# Patient Record
Sex: FEMALE | Race: WHITE | Employment: FULL TIME | URBAN - METROPOLITAN AREA
[De-identification: names, ages, dates, MRNs, and addresses within clinical notes are randomized per-mention and may not be internally consistent; named-entity substitution may affect disease eponyms.]

---

## 2022-08-25 ENCOUNTER — HOSPITAL ENCOUNTER (INPATIENT)
Age: 42
LOS: 5 days | Discharge: HOME | DRG: 872 | End: 2022-08-30
Attending: EMERGENCY MEDICINE | Admitting: INTERNAL MEDICINE
Payer: COMMERCIAL

## 2022-08-25 ENCOUNTER — APPOINTMENT (OUTPATIENT)
Dept: GENERAL RADIOLOGY | Age: 42
DRG: 872 | End: 2022-08-25
Payer: COMMERCIAL

## 2022-08-25 DIAGNOSIS — N30.00 ACUTE CYSTITIS WITHOUT HEMATURIA: Primary | ICD-10-CM

## 2022-08-25 DIAGNOSIS — E87.20 METABOLIC ACIDOSIS: ICD-10-CM

## 2022-08-25 DIAGNOSIS — F10.930 ALCOHOL WITHDRAWAL SYNDROME WITHOUT COMPLICATION (HCC): ICD-10-CM

## 2022-08-25 DIAGNOSIS — R74.8 ELEVATED LIVER ENZYMES: ICD-10-CM

## 2022-08-25 PROBLEM — N39.0 UTI (URINARY TRACT INFECTION): Status: ACTIVE | Noted: 2022-08-25

## 2022-08-25 LAB
ALBUMIN SERPL-MCNC: 4 G/DL (ref 3.5–4.6)
ALP BLD-CCNC: 177 U/L (ref 40–130)
ALT SERPL-CCNC: 76 U/L (ref 0–33)
AMPHETAMINE SCREEN, URINE: ABNORMAL
ANION GAP SERPL CALCULATED.3IONS-SCNC: 19 MEQ/L (ref 9–15)
AST SERPL-CCNC: 219 U/L (ref 0–35)
BACTERIA: ABNORMAL /HPF
BARBITURATE SCREEN URINE: ABNORMAL
BASOPHILS ABSOLUTE: 0.1 K/UL (ref 0–0.1)
BASOPHILS RELATIVE PERCENT: 0.3 % (ref 0.1–1.2)
BENZODIAZEPINE SCREEN, URINE: ABNORMAL
BILIRUB SERPL-MCNC: 1.2 MG/DL (ref 0.2–0.7)
BILIRUBIN URINE: NEGATIVE
BLOOD, URINE: ABNORMAL
BUN BLDV-MCNC: 11 MG/DL (ref 6–20)
CALCIUM SERPL-MCNC: 9.7 MG/DL (ref 8.5–9.9)
CANNABINOID SCREEN URINE: POSITIVE
CHLORIDE BLD-SCNC: 95 MEQ/L (ref 95–107)
CLARITY: ABNORMAL
CO2: 19 MEQ/L (ref 20–31)
COCAINE METABOLITE SCREEN URINE: ABNORMAL
COLOR: YELLOW
CREAT SERPL-MCNC: 0.81 MG/DL (ref 0.5–0.9)
EKG ATRIAL RATE: 124 BPM
EKG P AXIS: 69 DEGREES
EKG P-R INTERVAL: 146 MS
EKG Q-T INTERVAL: 348 MS
EKG QRS DURATION: 70 MS
EKG QTC CALCULATION (BAZETT): 499 MS
EKG R AXIS: 83 DEGREES
EKG T AXIS: 47 DEGREES
EKG VENTRICULAR RATE: 124 BPM
EOSINOPHILS ABSOLUTE: 0.1 K/UL (ref 0–0.4)
EOSINOPHILS RELATIVE PERCENT: 0.8 % (ref 0.7–5.8)
EPITHELIAL CELLS, UA: ABNORMAL /HPF
ETHANOL PERCENT: 0.01 G/DL
ETHANOL: 17 MG/DL (ref 0–0.08)
GFR AFRICAN AMERICAN: >60
GFR NON-AFRICAN AMERICAN: >60
GLOBULIN: 3.4 G/DL (ref 2.3–3.5)
GLUCOSE BLD-MCNC: 128 MG/DL (ref 70–99)
GLUCOSE URINE: NEGATIVE MG/DL
HCG(URINE) PREGNANCY TEST: NEGATIVE
HCT VFR BLD CALC: 36.2 % (ref 37–47)
HEMOGLOBIN: 13.1 G/DL (ref 11.2–15.7)
IMMATURE GRANULOCYTES #: 0.1 K/UL
IMMATURE GRANULOCYTES %: 0.6 %
INFLUENZA A BY PCR: NEGATIVE
INFLUENZA B BY PCR: NEGATIVE
KETONES, URINE: NEGATIVE MG/DL
LACTIC ACID: 2.6 MMOL/L (ref 0.5–2.2)
LEUKOCYTE ESTERASE, URINE: ABNORMAL
LYMPHOCYTES ABSOLUTE: 0.5 K/UL (ref 1.2–3.7)
LYMPHOCYTES RELATIVE PERCENT: 3.4 %
Lab: ABNORMAL
MCH RBC QN AUTO: 34.7 PG (ref 25.6–32.2)
MCHC RBC AUTO-ENTMCNC: 36.2 % (ref 32.2–35.5)
MCV RBC AUTO: 96 FL (ref 79.4–94.8)
MONOCYTES ABSOLUTE: 0.9 K/UL (ref 0.2–0.9)
MONOCYTES RELATIVE PERCENT: 6.2 % (ref 4.7–12.5)
NEUTROPHILS ABSOLUTE: 12.9 K/UL (ref 1.6–6.1)
NEUTROPHILS RELATIVE PERCENT: 88.7 % (ref 34–71.1)
NITRITE, URINE: POSITIVE
OPIATE SCREEN URINE: ABNORMAL
PDW BLD-RTO: 12.1 % (ref 11.7–14.4)
PH UA: 6.5 (ref 5–9)
PHENCYCLIDINE SCREEN URINE: ABNORMAL
PLATELET # BLD: 83 K/UL (ref 182–369)
PLATELET SLIDE REVIEW: ABNORMAL
POTASSIUM SERPL-SCNC: 3.3 MEQ/L (ref 3.4–4.9)
PROTEIN UA: 100 MG/DL
RBC # BLD: 3.77 M/UL (ref 3.93–5.22)
RBC UA: ABNORMAL /HPF (ref 0–2)
SARS-COV-2, NAAT: NOT DETECTED
SLIDE REVIEW: ABNORMAL
SODIUM BLD-SCNC: 133 MEQ/L (ref 135–144)
SPECIFIC GRAVITY UA: <=1.005 (ref 1–1.03)
TOTAL PROTEIN: 7.4 G/DL (ref 6.3–8)
URINE REFLEX TO CULTURE: YES
UROBILINOGEN, URINE: 0.2 E.U./DL
WBC # BLD: 14.5 K/UL (ref 4–10)
WBC UA: ABNORMAL /HPF (ref 0–5)

## 2022-08-25 PROCEDURE — 80306 DRUG TEST PRSMV INSTRMNT: CPT

## 2022-08-25 PROCEDURE — 6370000000 HC RX 637 (ALT 250 FOR IP): Performed by: INTERNAL MEDICINE

## 2022-08-25 PROCEDURE — 96366 THER/PROPH/DIAG IV INF ADDON: CPT

## 2022-08-25 PROCEDURE — 6360000002 HC RX W HCPCS: Performed by: EMERGENCY MEDICINE

## 2022-08-25 PROCEDURE — 80053 COMPREHEN METABOLIC PANEL: CPT

## 2022-08-25 PROCEDURE — 2500000003 HC RX 250 WO HCPCS: Performed by: EMERGENCY MEDICINE

## 2022-08-25 PROCEDURE — 71046 X-RAY EXAM CHEST 2 VIEWS: CPT

## 2022-08-25 PROCEDURE — 85025 COMPLETE CBC W/AUTO DIFF WBC: CPT

## 2022-08-25 PROCEDURE — 2580000003 HC RX 258: Performed by: INTERNAL MEDICINE

## 2022-08-25 PROCEDURE — 82077 ASSAY SPEC XCP UR&BREATH IA: CPT

## 2022-08-25 PROCEDURE — 87086 URINE CULTURE/COLONY COUNT: CPT

## 2022-08-25 PROCEDURE — 84703 CHORIONIC GONADOTROPIN ASSAY: CPT

## 2022-08-25 PROCEDURE — 6370000000 HC RX 637 (ALT 250 FOR IP): Performed by: EMERGENCY MEDICINE

## 2022-08-25 PROCEDURE — 87150 DNA/RNA AMPLIFIED PROBE: CPT

## 2022-08-25 PROCEDURE — 87635 SARS-COV-2 COVID-19 AMP PRB: CPT

## 2022-08-25 PROCEDURE — 2580000003 HC RX 258: Performed by: EMERGENCY MEDICINE

## 2022-08-25 PROCEDURE — 6360000002 HC RX W HCPCS: Performed by: INTERNAL MEDICINE

## 2022-08-25 PROCEDURE — 87502 INFLUENZA DNA AMP PROBE: CPT

## 2022-08-25 PROCEDURE — 87077 CULTURE AEROBIC IDENTIFY: CPT

## 2022-08-25 PROCEDURE — 87186 SC STD MICRODIL/AGAR DIL: CPT

## 2022-08-25 PROCEDURE — 96375 TX/PRO/DX INJ NEW DRUG ADDON: CPT

## 2022-08-25 PROCEDURE — 87040 BLOOD CULTURE FOR BACTERIA: CPT

## 2022-08-25 PROCEDURE — 36415 COLL VENOUS BLD VENIPUNCTURE: CPT

## 2022-08-25 PROCEDURE — 6360000002 HC RX W HCPCS

## 2022-08-25 PROCEDURE — 96365 THER/PROPH/DIAG IV INF INIT: CPT

## 2022-08-25 PROCEDURE — 1210000000 HC MED SURG R&B

## 2022-08-25 PROCEDURE — 81001 URINALYSIS AUTO W/SCOPE: CPT

## 2022-08-25 PROCEDURE — 83605 ASSAY OF LACTIC ACID: CPT

## 2022-08-25 PROCEDURE — 96361 HYDRATE IV INFUSION ADD-ON: CPT

## 2022-08-25 PROCEDURE — 96376 TX/PRO/DX INJ SAME DRUG ADON: CPT

## 2022-08-25 PROCEDURE — 93005 ELECTROCARDIOGRAM TRACING: CPT

## 2022-08-25 PROCEDURE — 99285 EMERGENCY DEPT VISIT HI MDM: CPT

## 2022-08-25 PROCEDURE — 96367 TX/PROPH/DG ADDL SEQ IV INF: CPT

## 2022-08-25 RX ORDER — LORAZEPAM 2 MG/ML
0.5 INJECTION INTRAMUSCULAR ONCE
Status: COMPLETED | OUTPATIENT
Start: 2022-08-25 | End: 2022-08-25

## 2022-08-25 RX ORDER — LORAZEPAM 2 MG/ML
2 INJECTION INTRAMUSCULAR EVERY 4 HOURS PRN
Status: DISCONTINUED | OUTPATIENT
Start: 2022-08-25 | End: 2022-08-25

## 2022-08-25 RX ORDER — NICOTINE 21 MG/24HR
1 PATCH, TRANSDERMAL 24 HOURS TRANSDERMAL DAILY
Status: DISCONTINUED | OUTPATIENT
Start: 2022-08-25 | End: 2022-08-30 | Stop reason: HOSPADM

## 2022-08-25 RX ORDER — ACETAMINOPHEN 325 MG/1
650 TABLET ORAL EVERY 6 HOURS PRN
Status: DISCONTINUED | OUTPATIENT
Start: 2022-08-25 | End: 2022-08-30 | Stop reason: HOSPADM

## 2022-08-25 RX ORDER — LORAZEPAM 2 MG/ML
1 INJECTION INTRAMUSCULAR
Status: DISCONTINUED | OUTPATIENT
Start: 2022-08-25 | End: 2022-08-30 | Stop reason: HOSPADM

## 2022-08-25 RX ORDER — 0.9 % SODIUM CHLORIDE 0.9 %
1000 INTRAVENOUS SOLUTION INTRAVENOUS ONCE
Status: COMPLETED | OUTPATIENT
Start: 2022-08-25 | End: 2022-08-25

## 2022-08-25 RX ORDER — LORAZEPAM 2 MG/ML
4 INJECTION INTRAMUSCULAR
Status: DISCONTINUED | OUTPATIENT
Start: 2022-08-25 | End: 2022-08-30 | Stop reason: HOSPADM

## 2022-08-25 RX ORDER — LORAZEPAM 1 MG/1
1 TABLET ORAL
Status: DISCONTINUED | OUTPATIENT
Start: 2022-08-25 | End: 2022-08-30 | Stop reason: HOSPADM

## 2022-08-25 RX ORDER — LORAZEPAM 1 MG/1
3 TABLET ORAL
Status: DISCONTINUED | OUTPATIENT
Start: 2022-08-25 | End: 2022-08-30 | Stop reason: HOSPADM

## 2022-08-25 RX ORDER — GAUZE BANDAGE 2" X 2"
100 BANDAGE TOPICAL DAILY
Status: DISCONTINUED | OUTPATIENT
Start: 2022-08-25 | End: 2022-08-25 | Stop reason: SDUPTHER

## 2022-08-25 RX ORDER — LORAZEPAM 2 MG/ML
2 INJECTION INTRAMUSCULAR
Status: DISCONTINUED | OUTPATIENT
Start: 2022-08-25 | End: 2022-08-30 | Stop reason: HOSPADM

## 2022-08-25 RX ORDER — LORAZEPAM 2 MG/ML
INJECTION INTRAMUSCULAR
Status: COMPLETED
Start: 2022-08-25 | End: 2022-08-25

## 2022-08-25 RX ORDER — SODIUM CHLORIDE 0.9 % (FLUSH) 0.9 %
5-40 SYRINGE (ML) INJECTION PRN
Status: DISCONTINUED | OUTPATIENT
Start: 2022-08-25 | End: 2022-08-30 | Stop reason: HOSPADM

## 2022-08-25 RX ORDER — ONDANSETRON 2 MG/ML
4 INJECTION INTRAMUSCULAR; INTRAVENOUS EVERY 6 HOURS PRN
Status: DISCONTINUED | OUTPATIENT
Start: 2022-08-25 | End: 2022-08-30 | Stop reason: HOSPADM

## 2022-08-25 RX ORDER — ENOXAPARIN SODIUM 100 MG/ML
30 INJECTION SUBCUTANEOUS DAILY
Status: DISCONTINUED | OUTPATIENT
Start: 2022-08-25 | End: 2022-08-30 | Stop reason: HOSPADM

## 2022-08-25 RX ORDER — GAUZE BANDAGE 2" X 2"
100 BANDAGE TOPICAL DAILY
Status: DISCONTINUED | OUTPATIENT
Start: 2022-08-25 | End: 2022-08-30 | Stop reason: HOSPADM

## 2022-08-25 RX ORDER — LORAZEPAM 2 MG/ML
0.5 INJECTION INTRAMUSCULAR EVERY 4 HOURS PRN
Status: DISCONTINUED | OUTPATIENT
Start: 2022-08-25 | End: 2022-08-25

## 2022-08-25 RX ORDER — ACETAMINOPHEN 650 MG/1
650 SUPPOSITORY RECTAL EVERY 6 HOURS PRN
Status: DISCONTINUED | OUTPATIENT
Start: 2022-08-25 | End: 2022-08-30 | Stop reason: HOSPADM

## 2022-08-25 RX ORDER — ONDANSETRON 4 MG/1
4 TABLET, ORALLY DISINTEGRATING ORAL EVERY 8 HOURS PRN
Status: DISCONTINUED | OUTPATIENT
Start: 2022-08-25 | End: 2022-08-30 | Stop reason: HOSPADM

## 2022-08-25 RX ORDER — ACETAMINOPHEN 325 MG/1
650 TABLET ORAL ONCE
Status: COMPLETED | OUTPATIENT
Start: 2022-08-25 | End: 2022-08-25

## 2022-08-25 RX ORDER — LORAZEPAM 2 MG/ML
3 INJECTION INTRAMUSCULAR
Status: DISCONTINUED | OUTPATIENT
Start: 2022-08-25 | End: 2022-08-30 | Stop reason: HOSPADM

## 2022-08-25 RX ORDER — LORAZEPAM 1 MG/1
4 TABLET ORAL
Status: DISCONTINUED | OUTPATIENT
Start: 2022-08-25 | End: 2022-08-30 | Stop reason: HOSPADM

## 2022-08-25 RX ORDER — POLYETHYLENE GLYCOL 3350 17 G/17G
17 POWDER, FOR SOLUTION ORAL DAILY PRN
Status: DISCONTINUED | OUTPATIENT
Start: 2022-08-25 | End: 2022-08-30 | Stop reason: HOSPADM

## 2022-08-25 RX ORDER — POTASSIUM CHLORIDE 20 MEQ/1
40 TABLET, EXTENDED RELEASE ORAL ONCE
Status: COMPLETED | OUTPATIENT
Start: 2022-08-25 | End: 2022-08-25

## 2022-08-25 RX ORDER — LORAZEPAM 1 MG/1
2 TABLET ORAL
Status: DISCONTINUED | OUTPATIENT
Start: 2022-08-25 | End: 2022-08-30 | Stop reason: HOSPADM

## 2022-08-25 RX ORDER — SODIUM CHLORIDE 9 MG/ML
INJECTION, SOLUTION INTRAVENOUS CONTINUOUS
Status: DISCONTINUED | OUTPATIENT
Start: 2022-08-25 | End: 2022-08-28

## 2022-08-25 RX ADMIN — LORAZEPAM 0.5 MG: 2 INJECTION INTRAMUSCULAR; INTRAVENOUS at 08:24

## 2022-08-25 RX ADMIN — ASCORBIC ACID, VITAMIN A PALMITATE, CHOLECALCIFEROL, THIAMINE HYDROCHLORIDE, RIBOFLAVIN-5 PHOSPHATE SODIUM, PYRIDOXINE HYDROCHLORIDE, NIACINAMIDE, DEXPANTHENOL, ALPHA-TOCOPHEROL ACETATE, VITAMIN K1, FOLIC ACID, BIOTIN, CYANOCOBALAMIN: 200; 3300; 200; 6; 3.6; 6; 40; 15; 10; 150; 600; 60; 5 INJECTION, SOLUTION INTRAVENOUS at 09:27

## 2022-08-25 RX ADMIN — POTASSIUM CHLORIDE 40 MEQ: 1500 TABLET, EXTENDED RELEASE ORAL at 13:18

## 2022-08-25 RX ADMIN — LORAZEPAM 4 MG: 2 INJECTION INTRAMUSCULAR at 13:05

## 2022-08-25 RX ADMIN — SODIUM CHLORIDE 1000 ML: 9 INJECTION, SOLUTION INTRAVENOUS at 08:23

## 2022-08-25 RX ADMIN — Medication 100 MG: at 13:18

## 2022-08-25 RX ADMIN — SODIUM CHLORIDE: 9 INJECTION, SOLUTION INTRAVENOUS at 13:18

## 2022-08-25 RX ADMIN — LORAZEPAM 0.5 MG: 2 INJECTION INTRAMUSCULAR; INTRAVENOUS at 11:28

## 2022-08-25 RX ADMIN — LORAZEPAM 4 MG: 2 INJECTION INTRAMUSCULAR; INTRAVENOUS at 13:05

## 2022-08-25 RX ADMIN — PIPERACILLIN AND TAZOBACTAM 3375 MG: 3; .375 INJECTION, POWDER, LYOPHILIZED, FOR SOLUTION INTRAVENOUS at 09:07

## 2022-08-25 RX ADMIN — PIPERACILLIN AND TAZOBACTAM 3375 MG: 3; .375 INJECTION, POWDER, LYOPHILIZED, FOR SOLUTION INTRAVENOUS at 16:38

## 2022-08-25 RX ADMIN — SODIUM CHLORIDE 1000 ML: 9 INJECTION, SOLUTION INTRAVENOUS at 11:25

## 2022-08-25 RX ADMIN — ACETAMINOPHEN 650 MG: 325 TABLET ORAL at 08:25

## 2022-08-25 ASSESSMENT — ENCOUNTER SYMPTOMS
BACK PAIN: 0
ABDOMINAL PAIN: 0
SORE THROAT: 0
COUGH: 0
NAUSEA: 0
VOMITING: 0
SINUS PAIN: 0
EYE REDNESS: 0
SHORTNESS OF BREATH: 0
EYE DISCHARGE: 0
DIARRHEA: 0
COLOR CHANGE: 0

## 2022-08-25 ASSESSMENT — PAIN DESCRIPTION - LOCATION
LOCATION: BACK
LOCATION: BACK

## 2022-08-25 ASSESSMENT — PAIN SCALES - GENERAL
PAINLEVEL_OUTOF10: 5
PAINLEVEL_OUTOF10: 8

## 2022-08-25 ASSESSMENT — PAIN DESCRIPTION - ORIENTATION
ORIENTATION: RIGHT;LOWER
ORIENTATION: MID

## 2022-08-25 ASSESSMENT — PAIN DESCRIPTION - DESCRIPTORS
DESCRIPTORS: ACHING
DESCRIPTORS: ACHING

## 2022-08-25 ASSESSMENT — PAIN - FUNCTIONAL ASSESSMENT: PAIN_FUNCTIONAL_ASSESSMENT: 0-10

## 2022-08-25 NOTE — ED TRIAGE NOTES
Pt c/o fever on/off since Tuesday, feeling shaky, had N/V on Tuesday, none since. Pt has not taken anything for fever today. Pain rated 8/10, lower back, right sided, denies any UTI symptoms but might be pregnant. Pt to ED4, swabbed for COVID and advised of plan of care by Dr. Elina Ruth during bedside exam.  Pt unable to urinate at this time.

## 2022-08-25 NOTE — ED PROVIDER NOTES
[facility] ED  EMERGENCY DEPARTMENT ENCOUNTER      Pt Name: Deanna Zazueta  MRN: [id number]  [place] 1980  Date of evaluation: 8/25/2022  Provider: Ashlyn Pino DO    CHIEF COMPLAINT       Chief Complaint   Patient presents with    Fever     Pt c/o fever on/off since Tuesday and feeling shaky, had N/V on tuesday     Chief complaint: Shaking chills  History of chief complaint: This 74-year-old female presents the emergency department complaining of onset with fever on Tuesday evening patient states she awoke with shaking chills had a temperature of 103. Patient states intermittent fever and shaking chills since. Patient denies any sore throat cough cold or runny nose no head or neck pain no chest pain palpitations or shortness of breath no abdominal pain nausea vomiting or diarrhea no dysuria hematuria frequency or urgency of urination. Patient denies pregnancy. Patient states she is a daily drinker usually 3-4 beers and 3-4 shots of fireball a day states she has been trying to decrease her alcohol states she maybe had half of that volume on Tuesday and even less yesterday stay \"sip of beer \"and a \"sip of fireball\". Patient denies any IV drug use. Nursing Notes were reviewed. REVIEW OF SYSTEMS    (2-9 systems for level 4, 10 or more for level 5)     Review of Systems   Constitutional:  Positive for chills and fever. HENT:  Negative for congestion, sinus pain and sore throat. Eyes:  Negative for discharge and redness. Respiratory:  Negative for cough and shortness of breath. Cardiovascular:  Negative for chest pain, palpitations and leg swelling. Gastrointestinal:  Negative for abdominal pain, diarrhea, nausea and vomiting. Genitourinary:  Negative for difficulty urinating, dysuria, flank pain and frequency. Musculoskeletal:  Negative for back pain and neck pain. Skin:  Negative for color change and rash.    Neurological:  Negative for dizziness, weakness, numbness and headaches. Hematological:  Negative for adenopathy. Except as noted above the remainder of the review of systems was reviewed and negative. PAST MEDICAL HISTORY   History reviewed. No pertinent past medical history. SURGICAL HISTORY       Past Surgical History:   Procedure Laterality Date     SECTION           CURRENT MEDICATIONS       Previous Medications    No medications on file       ALLERGIES     Patient has no known allergies. FAMILY HISTORY     History reviewed. No pertinent family history.        SOCIAL HISTORY       Social History     Socioeconomic History    Marital status:      Spouse name: None    Number of children: None    Years of education: None    Highest education level: None   Tobacco Use    Smoking status: Every Day     Packs/day: 1.00     Types: Cigarettes    Smokeless tobacco: Never   Vaping Use    Vaping Use: Some days    Substances: Nicotine   Substance and Sexual Activity    Alcohol use: Yes    Drug use: Yes     Types: Marijuana (Weed)         PHYSICAL EXAM    (up to 7 for level 4, 8 or more for level 5)     ED Triage Vitals [22 0748]   BP Temp Temp Source Heart Rate Resp SpO2 Height Weight   (!) 148/108 (!) 100.6 °F (38.1 °C) Oral (!) 120 18 99 % 5' 6\" (1.676 m) 110 lb (49.9 kg)       Physical Exam   Patient is awake alert interactive, anxious in appearance intermittently tearful with gross shaking tremors diffusely  Head is atraumatic normocephalic  Eyes pupils are equal and reactive sclera white cGeneral appearance: onjunctive are pink  Oral pharyngeal cavity is pink with good moisture, no exudates or ulcerations no asymmetry, the airway is widely patent  Neck: Supple no meningeal signs no adenopathy no JVD  Heart: Tachycardic at 120 regular no gross murmurs rubs or clicks   lungs: Breath sounds are clear with good air movement throughout no active wheezes rales or rhonchi no respiratory distress  Abdomen: Soft nontender with good bowel sounds no rebound rigidity or guarding no firm or pulsatile masses, no gross distention, femoral pulses full and symmetric  Back: Nontender to palpation no costovertebral angle tenderness  Skin: Warm and dry without rashes  Lower extremities: No edema or calf tenderness or asymmetry. Neurologic: Patient is awake alert oriented speech is clear and fluent pupils are equal and reactive extraocular muscles are intact facial symmetry is intact tongue is midline strength testing is full and symmetric bilaterally both the upper and lower extremity sensation is intact in all 4 extremity there is diffuse asymmetric tremoring in all 4 extremities    DIAGNOSTIC RESULTS     EKG: All EKG's are interpreted by the Emergency Department Physician who either signs or Co-signs this chart in the absence of a cardiologist.    EKG interpreted by ED physician indication tachycardia: Regular tachycardia with increased baseline artifact diffuse nonspecific ST-T change no acute infarction pattern. RADIOLOGY:   Non-plain film images such as CT, Ultrasound and MRI are read by the radiologist. Plain radiographic images are visualized and preliminarily interpreted by the emergency physician with the below findings:    Chest x-ray 2 views interpreted by ED physician indication fever: Heart mediastinum are within normal limits the lung fields are clear there are no acute consolidations vascular congestion or pneumothorax appreciated. Official radiology report is pending    Interpretation per the Radiologist below, if available at the time of this note:    XR CHEST (2 VW)   Final Result   NO ACUTE CARDIOPULMONARY DISEASE.            LABS:  Labs Reviewed   CBC WITH AUTO DIFFERENTIAL - Abnormal; Notable for the following components:       Result Value    WBC 14.5 (*)     RBC 3.77 (*)     Hematocrit 36.2 (*)     MCV 96.0 (*)     MCH 34.7 (*)     MCHC 36.2 (*)     Platelets 83 (*)     Neutrophils % 88.7 (*)     Neutrophils Absolute 12.9 (*)     Lymphocytes Absolute 0.5 (*)     All other components within normal limits   COMPREHENSIVE METABOLIC PANEL - Abnormal; Notable for the following components:    Sodium 133 (*)     Potassium 3.3 (*)     CO2 19 (*)     Anion Gap 19 (*)     Glucose 128 (*)     Total Bilirubin 1.2 (*)     Alkaline Phosphatase 177 (*)     ALT 76 (*)      (*)     All other components within normal limits   LACTIC ACID - Abnormal; Notable for the following components:    Lactic Acid 2.6 (*)     All other components within normal limits   URINALYSIS WITH REFLEX TO CULTURE - Abnormal; Notable for the following components:    Protein,  (*)     All other components within normal limits   MICROSCOPIC URINALYSIS - Abnormal; Notable for the following components:    WBC, UA 10-20 (*)     RBC, UA 3-5 (*)     Bacteria, UA MODERATE (*)     All other components within normal limits   URINE DRUG SCREEN, COMPREHENSIVE - Abnormal; Notable for the following components:    Cannabinoid Scrn, Ur POSITIVE (*)     All other components within normal limits   RAPID INFLUENZA A/B ANTIGENS   COVID-19, RAPID   CULTURE, BLOOD 1   CULTURE, BLOOD 2   CULTURE, URINE   PREGNANCY, URINE   ETHANOL   Laboratory review: CBC reveals a leukocytosis with a white count of 14.5 thrombocytopenia with a platelet count of 83, BMP reveals mild hyponatremia of 133 mild hypokalemia at 3.3, metabolic acidosis with a CO2 of 19, liver function test are mildly elevated alk phos of 177 ALT of 76 AST of 219, alcohol level mildly elevated at 17, COVID and influenza screens are negative, lactic acid level is elevated at 2.6, blood cultures x2 were sent and pending, urinalysis revealed findings of urinary tract infection, urine drug screen is positive for marijuana otherwise negative, urine pregnancy test is negative    All other labs were within normal range or not returned as of this dictation.     EMERGENCY DEPARTMENT COURSE and DIFFERENTIAL DIAGNOSIS/MDM:   Vitals:    Vitals:    08/25/22 0748 08/25/22 1005   BP: (!) 148/108 (!) 134/94   Pulse: (!) 120 84   Resp: 18 17   Temp: (!) 100.6 °F (38.1 °C) 99.2 °F (37.3 °C)   TempSrc: Oral Oral   SpO2: 99% 100%   Weight: 110 lb (49.9 kg)    Height: 5' 6\" (1.676 m)      Treatment and course: Patient had an IV established normal saline 1 L was given Ativan 0.5 mg IV and Tylenol 650 mg p.o. given. Zosyn 3.375 g IV was initiated empirically with the leukocytosis, fever and tachycardia a possibility of sepsis, banana bag was initiated. On repeat assessment patient resting comfortably appears much improved, no tremoring vital signs are stable patient afebrile. Repeat normal saline 1 L was given along with additional Ativan 0.5 mg IV    Patient does have finding of infection with urinary tract infection raising concern for the possibility of sepsis with the fever tachycardia metabolic acidosis and elevated lactic acid level. I am certainly suspicious for a component of alcohol withdrawal as well with the regular drinking low alcohol level and shakiness resolved with Ativan. Plan is to observe the patient with continued hospitalized with antibiotics for blood culture results    Coordination of care: Call was placed out to the hospitalist I spoke with Dr. Tani Covarrubias regarding the patient. He is comfortable admitting her medically here but would like us to first check with the hospital supervisor to assure staff is available for needed care with the possibility of alcohol withdrawal.    FINAL IMPRESSION      1. Acute cystitis without hematuria    2. Alcohol withdrawal syndrome without complication ([facility] [address]    3. Metabolic acidosis    4. Elevated liver enzymes          DISPOSITION/PLAN   DISPOSITION Decision To Admit 08/25/2022 10:47:55 AM  Patient awaiting official acceptance in stable condition    PATIENT REFERRED TO:  No follow-up provider specified.     DISCHARGE MEDICATIONS:  New Prescriptions    No medications on file     Controlled Substances Monitoring:     No flowsheet data found.     (Please note that portions of this note were completed with a voice recognition program.  Efforts were made to edit the dictations but occasionally words are mis-transcribed.)    Lashae Navarrete DO (electronically signed)  Attending Emergency Physician              Lashae Navarrete DO  08/25/22 [id number]

## 2022-08-25 NOTE — PROGRESS NOTES
Pt admitted to room 222 via stretcher from ER for UTI and alcohol withdrawal. VS as charted. PT from home with SO, children, and father. Oriented, uncontrolably tremoring, rating anxiety 8/10, denies headache or vision changes. Notified Dr. Amy Montalvo, awaiting orders.

## 2022-08-25 NOTE — ED NOTES
Pt report is given to RAYSA Griffiths, from second floor-pt is transported to room 222, via w/c.       Glory Dupont RN  08/25/22 6411

## 2022-08-26 LAB
ACINETOBACTER CALCOAC BAUMANNII COMPLEX BY PCR: NOT DETECTED
ANION GAP SERPL CALCULATED.3IONS-SCNC: 14 MEQ/L (ref 9–15)
BACTEROIDES FRAGILIS BY PCR: NOT DETECTED
BASOPHILS ABSOLUTE: 0 K/UL (ref 0–0.1)
BASOPHILS RELATIVE PERCENT: 0.4 % (ref 0.1–1.2)
BLOOD CULTURE, ROUTINE: ABNORMAL
BUN BLDV-MCNC: 8 MG/DL (ref 6–20)
CALCIUM SERPL-MCNC: 8.4 MG/DL (ref 8.5–9.9)
CANDIDA ALBICANS BY PCR: NOT DETECTED
CANDIDA AURIS BY PCR: NOT DETECTED
CANDIDA GLABRATA BY PCR: NOT DETECTED
CANDIDA KRUSEI BY PCR: NOT DETECTED
CANDIDA PARAPSILOSIS BY PCR: NOT DETECTED
CANDIDA TROPICALIS BY PCR: NOT DETECTED
CARBAPENEM RESISTANCE IMP GENE BY PCR: NOT DETECTED
CARBAPENEM RESISTANCE KPC BY PCR: NOT DETECTED
CARBAPENEM RESISTANCE NDM GENE BY PCR: NOT DETECTED
CARBAPENEM RESISTANCE OXA-48 GENE BY PCR: NOT DETECTED
CARBAPENEM RESISTANCE VIM GENE BY PCR: NOT DETECTED
CEPHALOSPORIN RESISTANCE CTX-M GENE BY PCR: NOT DETECTED
CHLORIDE BLD-SCNC: 106 MEQ/L (ref 95–107)
CO2: 18 MEQ/L (ref 20–31)
COLISTIN RESISTANCE MCR-1 GENE BY PCR: NOT DETECTED
CREAT SERPL-MCNC: 0.63 MG/DL (ref 0.5–0.9)
CRYPTOCOCCUS NEOFORMANS/GATTII BY PCR: NOT DETECTED
CULTURE, BLOOD 2: ABNORMAL
ENTEROBACTER CLOACAE COMPLEX BY PCR: NOT DETECTED
ENTEROBACTERALES BY PCR: DETECTED
ENTEROCOCCUS FAECALIS BY PCR: NOT DETECTED
ENTEROCOCCUS FAECIUM BY PCR: NOT DETECTED
EOSINOPHILS ABSOLUTE: 0.1 K/UL (ref 0–0.4)
EOSINOPHILS RELATIVE PERCENT: 0.6 % (ref 0.7–5.8)
ESCHERICHIA COLI BY PCR: DETECTED
GFR AFRICAN AMERICAN: >60
GFR NON-AFRICAN AMERICAN: >60
GLUCOSE BLD-MCNC: 67 MG/DL (ref 70–99)
HAEMOPHILUS INFLUENZAE BY PCR: NOT DETECTED
HCT VFR BLD CALC: 32 % (ref 37–47)
HEMOGLOBIN: 11.2 G/DL (ref 11.2–15.7)
IMMATURE GRANULOCYTES #: 0.1 K/UL
IMMATURE GRANULOCYTES %: 0.9 %
KLEBSIELLA AEROGENES BY PCR: NOT DETECTED
KLEBSIELLA OXYTOCA BY PCR: NOT DETECTED
KLEBSIELLA PNEUMONIAE GROUP BY PCR: NOT DETECTED
LACTIC ACID: 2.9 MMOL/L (ref 0.5–2.2)
LISTERIA MONOCYTOGENES BY PCR: NOT DETECTED
LV EF: 58 %
LVEF MODALITY: NORMAL
LYMPHOCYTES ABSOLUTE: 0.7 K/UL (ref 1.2–3.7)
LYMPHOCYTES RELATIVE PERCENT: 6.3 %
MAGNESIUM: 1.7 MG/DL (ref 1.7–2.4)
MCH RBC QN AUTO: 34.4 PG (ref 25.6–32.2)
MCHC RBC AUTO-ENTMCNC: 35 % (ref 32.2–35.5)
MCV RBC AUTO: 98.2 FL (ref 79.4–94.8)
MONOCYTES ABSOLUTE: 0.9 K/UL (ref 0.2–0.9)
MONOCYTES RELATIVE PERCENT: 8.8 % (ref 4.7–12.5)
NEISSERIA MENINGITIDIS BY PCR: NOT DETECTED
NEUTROPHILS ABSOLUTE: 8.7 K/UL (ref 1.6–6.1)
NEUTROPHILS RELATIVE PERCENT: 83 % (ref 34–71.1)
PDW BLD-RTO: 12.8 % (ref 11.7–14.4)
PLATELET # BLD: 73 K/UL (ref 182–369)
PLATELET SLIDE REVIEW: ABNORMAL
POTASSIUM REFLEX MAGNESIUM: 2.4 MEQ/L (ref 3.4–4.9)
PROTEUS SPECIES BY PCR: NOT DETECTED
PSEUDOMONAS AERUGINOSA BY PCR: NOT DETECTED
RBC # BLD: 3.26 M/UL (ref 3.93–5.22)
SALMONELLA SPECIES BY PCR: NOT DETECTED
SERRATIA MARCESCENS BY PCR: NOT DETECTED
SODIUM BLD-SCNC: 138 MEQ/L (ref 135–144)
STAPHYLOCOCCUS AUREUS BY PCR: NOT DETECTED
STAPHYLOCOCCUS EPIDERMIDIS BY PCR: NOT DETECTED
STAPHYLOCOCCUS LUGDUNENSIS BY PCR: NOT DETECTED
STAPHYLOCOCCUS SPECIES BY PCR: NOT DETECTED
STENOTROPHOMONAS MALTOPHILIA BY PCR: NOT DETECTED
STREPTOCOCCUS AGALACTIAE BY PCR: NOT DETECTED
STREPTOCOCCUS PNEUMONIAE BY PCR: NOT DETECTED
STREPTOCOCCUS PYOGENES  BY PCR: NOT DETECTED
STREPTOCOCCUS SPECIES BY PCR: NOT DETECTED
WBC # BLD: 10.5 K/UL (ref 4–10)

## 2022-08-26 PROCEDURE — 87077 CULTURE AEROBIC IDENTIFY: CPT

## 2022-08-26 PROCEDURE — 6370000000 HC RX 637 (ALT 250 FOR IP): Performed by: INTERNAL MEDICINE

## 2022-08-26 PROCEDURE — 85025 COMPLETE CBC W/AUTO DIFF WBC: CPT

## 2022-08-26 PROCEDURE — 1210000000 HC MED SURG R&B

## 2022-08-26 PROCEDURE — 87186 SC STD MICRODIL/AGAR DIL: CPT

## 2022-08-26 PROCEDURE — 6360000002 HC RX W HCPCS: Performed by: INTERNAL MEDICINE

## 2022-08-26 PROCEDURE — 36415 COLL VENOUS BLD VENIPUNCTURE: CPT

## 2022-08-26 PROCEDURE — 2580000003 HC RX 258: Performed by: INTERNAL MEDICINE

## 2022-08-26 PROCEDURE — 93306 TTE W/DOPPLER COMPLETE: CPT

## 2022-08-26 PROCEDURE — 83735 ASSAY OF MAGNESIUM: CPT

## 2022-08-26 PROCEDURE — 87040 BLOOD CULTURE FOR BACTERIA: CPT

## 2022-08-26 PROCEDURE — 80048 BASIC METABOLIC PNL TOTAL CA: CPT

## 2022-08-26 PROCEDURE — 83605 ASSAY OF LACTIC ACID: CPT

## 2022-08-26 PROCEDURE — 6370000000 HC RX 637 (ALT 250 FOR IP): Performed by: EMERGENCY MEDICINE

## 2022-08-26 RX ORDER — POTASSIUM CHLORIDE 20 MEQ/1
40 TABLET, EXTENDED RELEASE ORAL 2 TIMES DAILY WITH MEALS
Status: DISCONTINUED | OUTPATIENT
Start: 2022-08-26 | End: 2022-08-29

## 2022-08-26 RX ORDER — LANOLIN ALCOHOL/MO/W.PET/CERES
400 CREAM (GRAM) TOPICAL DAILY
Status: DISCONTINUED | OUTPATIENT
Start: 2022-08-26 | End: 2022-08-30 | Stop reason: HOSPADM

## 2022-08-26 RX ADMIN — POTASSIUM CHLORIDE 40 MEQ: 20 TABLET, EXTENDED RELEASE ORAL at 08:43

## 2022-08-26 RX ADMIN — ACETAMINOPHEN 650 MG: 325 TABLET ORAL at 21:09

## 2022-08-26 RX ADMIN — PIPERACILLIN AND TAZOBACTAM 3375 MG: 3; .375 INJECTION, POWDER, LYOPHILIZED, FOR SOLUTION INTRAVENOUS at 01:16

## 2022-08-26 RX ADMIN — LORAZEPAM 1 MG: 1 TABLET ORAL at 22:06

## 2022-08-26 RX ADMIN — POTASSIUM CHLORIDE 40 MEQ: 20 TABLET, EXTENDED RELEASE ORAL at 17:07

## 2022-08-26 RX ADMIN — ACETAMINOPHEN 650 MG: 325 TABLET ORAL at 00:15

## 2022-08-26 RX ADMIN — PIPERACILLIN AND TAZOBACTAM 3375 MG: 3; .375 INJECTION, POWDER, LYOPHILIZED, FOR SOLUTION INTRAVENOUS at 08:44

## 2022-08-26 RX ADMIN — SODIUM CHLORIDE: 9 INJECTION, SOLUTION INTRAVENOUS at 03:55

## 2022-08-26 RX ADMIN — PIPERACILLIN AND TAZOBACTAM 3375 MG: 3; .375 INJECTION, POWDER, LYOPHILIZED, FOR SOLUTION INTRAVENOUS at 17:08

## 2022-08-26 RX ADMIN — Medication 100 MG: at 08:43

## 2022-08-26 RX ADMIN — Medication 400 MG: at 09:00

## 2022-08-26 ASSESSMENT — PAIN SCALES - GENERAL
PAINLEVEL_OUTOF10: 0
PAINLEVEL_OUTOF10: 0

## 2022-08-26 NOTE — H&P
Hospital Medicine History & Physical      PCP: No primary care provider on file. Date of Admission: 2022    Date of Service: 22      Chief Complaint:  fever/chills       History Of Present Illness:  38 y.o. female who presented to [facility] with new onset nocturnal fever for the past 3 days, uncontrollable tremor/shakiness. Had nausea and episode with vomiting at home. Since her condition wasn't improved, eventually came to ER and after initial stabilization was admitted for further management. Admits to drink alcohol daily. Denied dyspnea, CP, dizziness       Past Medical History:      History reviewed. No pertinent past medical history. Past Surgical History:          Procedure Laterality Date     SECTION         Medications Prior to Admission:      Prior to Admission medications    Not on File       Allergies:  Patient has no known allergies. Social History:      The patient currently lives home    TOBACCO:   reports that she has been smoking cigarettes. She has been smoking an average of 1 pack per day. She has never used smokeless tobacco.  ETOH:   reports current alcohol use. Family History:       Reviewed in detail and negative for DM, CAD, Cancer, CVA. Positive as follows:    History reviewed. No pertinent family history. REVIEW OF SYSTEMS:   Pertinent positives as noted in the HPI. All other systems reviewed and negative. PHYSICAL EXAM:    /86   Pulse 95   Temp 98.4 °F (36.9 °C) (Oral)   Resp 18   Ht 5' 6\" (1.676 m)   Wt 104 lb 3.2 oz (47.3 kg)   LMP 2022 (Approximate)   SpO2 98%   BMI 16.82 kg/m²     General appearance:  No apparent distress, appears stated age and cooperative. HEENT:  Normal cephalic, atraumatic without obvious deformity. Pupils equal, round, and reactive to light. Extra ocular muscles intact. Conjunctivae/corneas clear. Neck: Supple, with full range of motion. No jugular venous distention.  Trachea midline. Respiratory:  Normal respiratory effort. Clear to auscultation, bilaterally without Rales/Wheezes/Rhonchi. Cardiovascular:  Regular rate and rhythm with normal S1/S2 without murmurs, rubs or gallops. Abdomen: Soft, non-tender, non-distended with normal bowel sounds. Musculoskeletal:  No clubbing, cyanosis or edema bilaterally. Full range of motion without deformity. Skin: Skin color, texture, turgor normal.  No rashes or lesions. Neurologic:  Neurovascularly intact without any focal sensory/motor deficits. Cranial nerves: II-XII intact, grossly non-focal.  Psychiatric:  Alert and oriented, thought content appropriate, normal insight  Capillary Refill: Brisk,< 3 seconds   Peripheral Pulses: +2 palpable, equal bilaterally       Labs:     Recent Labs     08/25/22  0811 08/26/22  0615   WBC 14.5* 10.5*   HGB 13.1 11.2   HCT 36.2* 32.0*   PLT 83* 73*     Recent Labs     08/25/22  0810 08/26/22  0615   * 138   K 3.3* 2.4*   CL 95 106   CO2 19* 18*   BUN 11 8   CREATININE 0.81 0.63   CALCIUM 9.7 8.4*     Recent Labs     08/25/22  0810   *   ALT 76*   BILITOT 1.2*   ALKPHOS 177*     No results for input(s): INR in the last 72 hours. No results for input(s): Karla Shanks in the last 72 hours. Urinalysis:      Lab Results   Component Value Date/Time    NITRU Positive 08/25/2022 10:19 AM    WBCUA 10-20 08/25/2022 10:19 AM    BACTERIA MODERATE 08/25/2022 10:19 AM    RBCUA 3-5 08/25/2022 10:19 AM    BLOODU Small 08/25/2022 10:19 AM    SPECGRAV <=1.005 08/25/2022 10:19 AM    GLUCOSEU Negative 08/25/2022 10:19 AM       Radiology:     CXR: I have reviewed the CXR with the following interpretation:   EKG:  I have reviewed the EKG with the following interpretation:     XR CHEST (2 VW)   Final Result   NO ACUTE CARDIOPULMONARY DISEASE.            ASSESSMENT:    Active Hospital Problems    Diagnosis Date Noted    UTI (urinary tract infection) [N39.0] 08/25/2022     Priority: Medium PLAN:        DVT Prophylaxis:   Diet: ADULT DIET; Regular  ADULT ORAL NUTRITION SUPPLEMENT; Breakfast, Dinner; Clear Liquid Oral Supplement  ADULT ORAL NUTRITION SUPPLEMENT; Lunch; Standard High Calorie/High Protein Oral Supplement  Code Status: Full Code    PT/OT Eval Status:     Dispo - High grade fever, leucocytosis, metabolic acidosis due to positive BC on admission- initiated zosyn, repeat BC today, performing 2 d echo  UTI/pyuria- atbs as above  Fever/tremor, anxiety due to DT- ativan per protocol, advice cut down alcohol use  Hypokalemia- replace, follow up with BMP AM   Patient adamant to leave hospital today- explained her condition and findings regarding sepsis/bacteremia, she will stay but still has high chance to leave AMA   Medically stable for acute admission at [facility] MD Kenisha    Thank you No primary care provider on file. for the opportunity to be involved in this patient's care. If you have any questions or concerns please feel free to contact me.

## 2022-08-26 NOTE — PROGRESS NOTES
Pt sleeping at this time. Pt is alert and oriented. Pt denies any pain at this time. Pt states \" I am very tired\". Pt assisted up to the bathroom due to unsteadiness. Pt bed alarm is on and call light in reach. Pt ciwa done and scored a 3. Pt has tremors in bilateral arms. Pt vitals stable. 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 Pt denies any needs at this time. Asked pt if she wanted ativan to help with her tremors and help her calm down, pt stated \"no, I am fine right now\". Will continue to monitor pt.   Electronically signed by Jeremie Way RN on 8/25/2022 at 10:42 PM

## 2022-08-26 NOTE — PROGRESS NOTES
[facility] Initial Discharge Assessment    Met with Patient to discuss discharge plan. PCP: No primary care provider on file. Date of Last Visit: \"It's been a few years\"     If no PCP, list provided? Yes    Discharge Planning    Living Arrangements: independently at home    Who do you live with? Boyfriend, 15 year od daughter and 66year old father. Patient's boyfriend is present and reports that he is helping provide care to 15year old and 66year old     Who helps you with your care:  self    If lives at home:     Do you have any barriers navigating in your home? no    Patient can perform ADL? Yes    Current Services (outpatient and in home) :  None    Dialysis: No    Is transportation available to get to your appointments? Yes    DME Equipment:  Patient reports not needing or using any assisted devices     Respiratory equipment: None    Respiratory provider:  no     Pharmacy:  yes - [facility] in [address] to afford cost of medication: Patient reports not currently taking any prescribed medication     Does patient feel safe at home? Yes    Does patient identify any home going needs? No    Patient agreeable to [facility]? No    Patient agreeable to SNF/Rehab? No    Other discharge needs identified? Patient is reported to consume ETOH daily. Patient currently on CIWA protocol for ETOH. This  provided patient with literature on substance abuse rehab resources along with mental health resources. Patient thanked this  for resources and reports plan to return home upon DC and follow up with \"meetings\"     Was Freedom of Choice Provided? Yes    Initial Discharge Plan? (Note: please see concurrent daily documentation for any updates after initial note). Patient admitted to [facility] with a diagnosis of UTI. Patient's care discussed in daily quality rounds. Patient reported to be receiving IV antibiotics.   Patient reported to drink ETOH daily and currently on CIWA protocol at [facility]. This  met with patient and provided patient with literature on substance abuse rehab resources along with mental health resources and 24/7 crisis hotline number. Patient thanked this  for resources and reports plan to return home upon DC and follow up with \"meetings. \"  Patient then appears to become visibly upset demonstrated by starting to cry and reporting \"I have responsibilities. I need to get out of here. \"  Patient's boyfriend reassures patient  that he is looking after 15year old and patient's 66year old father. This  provided support through active listening, validation and encouraging patient to remain at [facility] to help treat infection receive. 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 No further DC or help at home needs identified by patient or boyfriend at this time.      Electronically signed by AJ Coburn on 8/26/2022 at 4:44 PM

## 2022-08-26 NOTE — PLAN OF CARE
Problem: Discharge Planning  Goal: Discharge to home or other facility with appropriate resources  Outcome: Progressing  Flowsheets (Taken 8/25/2022 1410 by Valentine Walsh RN)  Discharge to home or other facility with appropriate resources: Identify barriers to discharge with patient and caregiver     Problem: Safety - Adult  Goal: Free from fall injury  Outcome: Progressing     Problem: ABCDS Injury Assessment  Goal: Absence of physical injury  Outcome: Progressing     Problem: Confusion  Goal: Confusion, delirium, dementia, or psychosis is improved or at baseline  Description: INTERVENTIONS:  1. Assess for possible contributors to thought disturbance, including medications, impaired vision or hearing, underlying metabolic abnormalities, dehydration, psychiatric diagnoses, and notify attending LIP  2. [place] high risk fall precautions, as indicated  3. Provide frequent short contacts to provide reality reorientation, refocusing and direction  4. Decrease environmental stimuli, including noise as appropriate  5. Monitor and intervene to maintain adequate nutrition, hydration, elimination, sleep and activity  6. If unable to ensure safety without constant attention obtain sitter and review sitter guidelines with assigned personnel  7.  Initiate Psychosocial CNS and Spiritual Care consult, as indicated  Outcome: Progressing

## 2022-08-27 LAB
ANION GAP SERPL CALCULATED.3IONS-SCNC: 12 MEQ/L (ref 9–15)
BASOPHILS ABSOLUTE: 0 K/UL (ref 0–0.1)
BASOPHILS RELATIVE PERCENT: 0.4 % (ref 0.1–1.2)
BUN BLDV-MCNC: 7 MG/DL (ref 6–20)
CALCIUM SERPL-MCNC: 8.7 MG/DL (ref 8.5–9.9)
CHLORIDE BLD-SCNC: 106 MEQ/L (ref 95–107)
CO2: 21 MEQ/L (ref 20–31)
CREAT SERPL-MCNC: 0.54 MG/DL (ref 0.5–0.9)
EOSINOPHILS ABSOLUTE: 0.1 K/UL (ref 0–0.4)
EOSINOPHILS RELATIVE PERCENT: 1.4 % (ref 0.7–5.8)
GFR AFRICAN AMERICAN: >60
GFR NON-AFRICAN AMERICAN: >60
GLUCOSE BLD-MCNC: 86 MG/DL (ref 70–99)
HCT VFR BLD CALC: 32.7 % (ref 37–47)
HEMOGLOBIN: 11.5 G/DL (ref 11.2–15.7)
IMMATURE GRANULOCYTES #: 0.1 K/UL
IMMATURE GRANULOCYTES %: 1 %
LACTIC ACID: 1.2 MMOL/L (ref 0.5–2.2)
LYMPHOCYTES ABSOLUTE: 0.8 K/UL (ref 1.2–3.7)
LYMPHOCYTES RELATIVE PERCENT: 8.8 %
MCH RBC QN AUTO: 33.9 PG (ref 25.6–32.2)
MCHC RBC AUTO-ENTMCNC: 35.2 % (ref 32.2–35.5)
MCV RBC AUTO: 96.5 FL (ref 79.4–94.8)
MONOCYTES ABSOLUTE: 1.3 K/UL (ref 0.2–0.9)
MONOCYTES RELATIVE PERCENT: 13.9 % (ref 4.7–12.5)
NEUTROPHILS ABSOLUTE: 7 K/UL (ref 1.6–6.1)
NEUTROPHILS RELATIVE PERCENT: 74.5 % (ref 34–71.1)
ORGANISM: ABNORMAL
PDW BLD-RTO: 12.6 % (ref 11.7–14.4)
PLATELET # BLD: 88 K/UL (ref 182–369)
PLATELET SLIDE REVIEW: ABNORMAL
POTASSIUM SERPL-SCNC: 3 MEQ/L (ref 3.4–4.9)
RBC # BLD: 3.39 M/UL (ref 3.93–5.22)
SODIUM BLD-SCNC: 139 MEQ/L (ref 135–144)
URINE CULTURE, ROUTINE: ABNORMAL
URINE CULTURE, ROUTINE: ABNORMAL
WBC # BLD: 9.4 K/UL (ref 4–10)

## 2022-08-27 PROCEDURE — 6370000000 HC RX 637 (ALT 250 FOR IP): Performed by: INTERNAL MEDICINE

## 2022-08-27 PROCEDURE — 80048 BASIC METABOLIC PNL TOTAL CA: CPT

## 2022-08-27 PROCEDURE — 85025 COMPLETE CBC W/AUTO DIFF WBC: CPT

## 2022-08-27 PROCEDURE — 6370000000 HC RX 637 (ALT 250 FOR IP): Performed by: EMERGENCY MEDICINE

## 2022-08-27 PROCEDURE — 36415 COLL VENOUS BLD VENIPUNCTURE: CPT

## 2022-08-27 PROCEDURE — 6360000002 HC RX W HCPCS: Performed by: INTERNAL MEDICINE

## 2022-08-27 PROCEDURE — 83605 ASSAY OF LACTIC ACID: CPT

## 2022-08-27 PROCEDURE — 1210000000 HC MED SURG R&B

## 2022-08-27 PROCEDURE — 2580000003 HC RX 258: Performed by: INTERNAL MEDICINE

## 2022-08-27 RX ADMIN — SODIUM CHLORIDE: 9 INJECTION, SOLUTION INTRAVENOUS at 01:03

## 2022-08-27 RX ADMIN — Medication 400 MG: at 08:15

## 2022-08-27 RX ADMIN — LORAZEPAM 1 MG: 1 TABLET ORAL at 08:15

## 2022-08-27 RX ADMIN — POTASSIUM CHLORIDE 40 MEQ: 20 TABLET, EXTENDED RELEASE ORAL at 08:15

## 2022-08-27 RX ADMIN — PIPERACILLIN AND TAZOBACTAM 3375 MG: 3; .375 INJECTION, POWDER, LYOPHILIZED, FOR SOLUTION INTRAVENOUS at 09:14

## 2022-08-27 RX ADMIN — PIPERACILLIN AND TAZOBACTAM 3375 MG: 3; .375 INJECTION, POWDER, LYOPHILIZED, FOR SOLUTION INTRAVENOUS at 01:04

## 2022-08-27 RX ADMIN — LORAZEPAM 1 MG: 1 TABLET ORAL at 21:08

## 2022-08-27 RX ADMIN — PIPERACILLIN AND TAZOBACTAM 3375 MG: 3; .375 INJECTION, POWDER, LYOPHILIZED, FOR SOLUTION INTRAVENOUS at 17:10

## 2022-08-27 RX ADMIN — Medication 100 MG: at 08:15

## 2022-08-27 RX ADMIN — SODIUM CHLORIDE: 9 INJECTION, SOLUTION INTRAVENOUS at 13:07

## 2022-08-27 RX ADMIN — POTASSIUM CHLORIDE 40 MEQ: 20 TABLET, EXTENDED RELEASE ORAL at 17:10

## 2022-08-27 ASSESSMENT — PAIN SCALES - GENERAL: PAINLEVEL_OUTOF10: 0

## 2022-08-27 NOTE — PROGRESS NOTES
Hospitalist Progress Note      PCP: No primary care provider on file. Date of Admission: 8/25/2022    Chief Complaint: weakness, tremor    Subjective: pt looks better today, less agitated     Medications:  Reviewed    Infusion Medications    sodium chloride 100 mL/hr at 08/27/22 0103     Scheduled Medications    potassium chloride  40 mEq Oral BID WC    magnesium oxide  400 mg Oral Daily    nicotine  1 patch TransDERmal Daily    piperacillin-tazobactam  3,375 mg IntraVENous Q8H    thiamine  100 mg Oral Daily    [Held by provider] enoxaparin  30 mg SubCUTAneous Daily     PRN Meds: ondansetron **OR** ondansetron, polyethylene glycol, acetaminophen **OR** acetaminophen, LORazepam **OR** LORazepam **OR** LORazepam **OR** LORazepam **OR** LORazepam **OR** LORazepam **OR** LORazepam **OR** LORazepam, sodium chloride flush      Intake/Output Summary (Last 24 hours) at 8/27/2022 0741  Last data filed at 8/27/2022 0556  Gross per 24 hour   Intake 1440 ml   Output --   Net 1440 ml       Exam:    BP (!) 147/89   Pulse 78   Temp 99.7 °F (37.6 °C)   Resp 18   Ht 5' 6\" (1.676 m)   Wt 104 lb 3.2 oz (47.3 kg)   LMP 06/28/2022 (Approximate)   SpO2 100%   BMI 16.82 kg/m²     General appearance: No apparent distress, appears stated age and cooperative. HEENT: Pupils equal, round, and reactive to light. Conjunctivae/corneas clear. Neck: Supple, with full range of motion. No jugular venous distention. Trachea midline. Respiratory:  Normal respiratory effort. Clear to auscultation, bilaterally without Rales/Wheezes/Rhonchi. Cardiovascular: Regular rate and rhythm with normal S1/S2 without murmurs, rubs or gallops. Abdomen: Soft, non-tender, non-distended with normal bowel sounds. Musculoskeletal: No clubbing, cyanosis or edema bilaterally. Full range of motion without deformity. Skin: Skin color, texture, turgor normal.  No rashes or lesions.   Neurologic:  Neurovascularly intact without any focal sensory/motor deficits. Cranial nerves: II-XII intact, grossly non-focal.  Psychiatric: Alert and oriented, thought content appropriate, normal insight  Capillary Refill: Brisk,< 3 seconds   Peripheral Pulses: +2 palpable, equal bilaterally       Labs:   Recent Labs     08/25/22  0811 08/26/22  0615 08/27/22  0601   WBC 14.5* 10.5* 9.4   HGB 13.1 11.2 11.5   HCT 36.2* 32.0* 32.7*   PLT 83* 73* 88*     Recent Labs     08/25/22  0810 08/26/22  0615 08/27/22  0600   * 138 139   K 3.3* 2.4* 3.0*   CL 95 106 106   CO2 19* 18* 21   BUN 11 8 7   CREATININE 0.81 0.63 0.54   CALCIUM 9.7 8.4* 8.7     Recent Labs     08/25/22  0810   *   ALT 76*   BILITOT 1.2*   ALKPHOS 177*     No results for input(s): INR in the last 72 hours. No results for input(s): [place] in the last 72 hours. Urinalysis:      Lab Results   Component Value Date/Time    NITRU Positive 08/25/2022 10:19 AM    WBCUA 10-20 08/25/2022 10:19 AM    BACTERIA MODERATE 08/25/2022 10:19 AM    RBCUA 3-5 08/25/2022 10:19 AM    BLOODU Small 08/25/2022 10:19 AM    SPECGRAV <=1.005 08/25/2022 10:19 AM    GLUCOSEU Negative 08/25/2022 10:19 AM       Radiology:  XR CHEST (2 VW)   Final Result   NO ACUTE CARDIOPULMONARY DISEASE. Assessment/Plan:    Active Hospital Problems    Diagnosis Date Noted    UTI (urinary tract infection) [N39.0] 08/25/2022     Priority: Medium         DVT Prophylaxis: hold  Diet: ADULT DIET;  Regular  ADULT ORAL NUTRITION SUPPLEMENT; Breakfast, Dinner; Clear Liquid Oral Supplement  ADULT ORAL NUTRITION SUPPLEMENT; Lunch; Standard High Calorie/High Protein Oral Supplement  Code Status: Full Code    PT/OT Eval Status:     Dispo -  High grade fever, leucocytosis, metabolic acidosis due to positive BC on admission- improving after initiated zosyn/IV hydration, repeated BC yesterday,  2 d echo report pending  UTI/pyuria- atbs as above  Fever/tremor, anxiety due to DT- ativan per protocol, advice cut down alcohol use  Hypokalemia- replacing, follow up with BMP AM   Thrombocytopenia due to alcohol use- hold lovenox, follow up with CBC AM   Medically stable for acute admission at [facility] signed by Laina Grullon MD on 8/27/2022 at 7:41 AM

## 2022-08-27 NOTE — PROGRESS NOTES
Pt alert and oriented. Pt denies any pain or needs at this time. Pt has a slight temp, gave pt prn tylenol per mar. Pt up and independent. Pt has iv fluids in fusing. Pt call light in reach. Pt ciwa scale a 4 so no ativan needed at this time. Will continue to monitor pt.   Electronically signed by Angelica Lees RN on 8/26/2022 at 9:29 PM

## 2022-08-28 LAB
ANION GAP SERPL CALCULATED.3IONS-SCNC: 13 MEQ/L (ref 9–15)
BASOPHILS ABSOLUTE: 0.1 K/UL (ref 0–0.1)
BASOPHILS RELATIVE PERCENT: 0.8 % (ref 0.1–1.2)
BLOOD CULTURE, ROUTINE: ABNORMAL
BUN BLDV-MCNC: 5 MG/DL (ref 6–20)
CALCIUM SERPL-MCNC: 8.8 MG/DL (ref 8.5–9.9)
CHLORIDE BLD-SCNC: 105 MEQ/L (ref 95–107)
CO2: 21 MEQ/L (ref 20–31)
CREAT SERPL-MCNC: 0.47 MG/DL (ref 0.5–0.9)
CULTURE, BLOOD 2: ABNORMAL
CULTURE, BLOOD ID SENSITIVITY: ABNORMAL
EOSINOPHILS ABSOLUTE: 0.1 K/UL (ref 0–0.4)
EOSINOPHILS RELATIVE PERCENT: 1.8 % (ref 0.7–5.8)
GFR AFRICAN AMERICAN: >60
GFR NON-AFRICAN AMERICAN: >60
GLUCOSE BLD-MCNC: 80 MG/DL (ref 70–99)
HCT VFR BLD CALC: 30.4 % (ref 37–47)
HEMOGLOBIN: 10.6 G/DL (ref 11.2–15.7)
IMMATURE GRANULOCYTES #: 0.1 K/UL
IMMATURE GRANULOCYTES %: 0.9 %
LYMPHOCYTES ABSOLUTE: 1.4 K/UL (ref 1.2–3.7)
LYMPHOCYTES RELATIVE PERCENT: 21.6 %
MCH RBC QN AUTO: 34.2 PG (ref 25.6–32.2)
MCHC RBC AUTO-ENTMCNC: 34.9 % (ref 32.2–35.5)
MCV RBC AUTO: 98.1 FL (ref 79.4–94.8)
MONOCYTES ABSOLUTE: 1.2 K/UL (ref 0.2–0.9)
MONOCYTES RELATIVE PERCENT: 17.3 % (ref 4.7–12.5)
NEUTROPHILS ABSOLUTE: 3.8 K/UL (ref 1.6–6.1)
NEUTROPHILS RELATIVE PERCENT: 57.6 % (ref 34–71.1)
ORGANISM: ABNORMAL
ORGANISM: ABNORMAL
PDW BLD-RTO: 13 % (ref 11.7–14.4)
PLATELET # BLD: 124 K/UL (ref 182–369)
PLATELET SLIDE REVIEW: ABNORMAL
POTASSIUM SERPL-SCNC: 3.4 MEQ/L (ref 3.4–4.9)
RBC # BLD: 3.1 M/UL (ref 3.93–5.22)
SODIUM BLD-SCNC: 139 MEQ/L (ref 135–144)
WBC # BLD: 6.7 K/UL (ref 4–10)

## 2022-08-28 PROCEDURE — 80048 BASIC METABOLIC PNL TOTAL CA: CPT

## 2022-08-28 PROCEDURE — 1210000000 HC MED SURG R&B

## 2022-08-28 PROCEDURE — 85025 COMPLETE CBC W/AUTO DIFF WBC: CPT

## 2022-08-28 PROCEDURE — 87077 CULTURE AEROBIC IDENTIFY: CPT

## 2022-08-28 PROCEDURE — 2580000003 HC RX 258: Performed by: INTERNAL MEDICINE

## 2022-08-28 PROCEDURE — 6360000002 HC RX W HCPCS: Performed by: INTERNAL MEDICINE

## 2022-08-28 PROCEDURE — 6370000000 HC RX 637 (ALT 250 FOR IP): Performed by: EMERGENCY MEDICINE

## 2022-08-28 PROCEDURE — 87040 BLOOD CULTURE FOR BACTERIA: CPT

## 2022-08-28 PROCEDURE — 6370000000 HC RX 637 (ALT 250 FOR IP): Performed by: INTERNAL MEDICINE

## 2022-08-28 PROCEDURE — 36415 COLL VENOUS BLD VENIPUNCTURE: CPT

## 2022-08-28 RX ADMIN — LORAZEPAM 1 MG: 1 TABLET ORAL at 10:51

## 2022-08-28 RX ADMIN — POTASSIUM CHLORIDE 40 MEQ: 20 TABLET, EXTENDED RELEASE ORAL at 09:51

## 2022-08-28 RX ADMIN — Medication 100 MG: at 09:45

## 2022-08-28 RX ADMIN — PIPERACILLIN AND TAZOBACTAM 3375 MG: 3; .375 INJECTION, POWDER, LYOPHILIZED, FOR SOLUTION INTRAVENOUS at 09:42

## 2022-08-28 RX ADMIN — LORAZEPAM 2 MG: 1 TABLET ORAL at 18:09

## 2022-08-28 RX ADMIN — PIPERACILLIN AND TAZOBACTAM 3375 MG: 3; .375 INJECTION, POWDER, LYOPHILIZED, FOR SOLUTION INTRAVENOUS at 17:56

## 2022-08-28 RX ADMIN — PIPERACILLIN AND TAZOBACTAM 3375 MG: 3; .375 INJECTION, POWDER, LYOPHILIZED, FOR SOLUTION INTRAVENOUS at 01:15

## 2022-08-28 RX ADMIN — SODIUM CHLORIDE: 9 INJECTION, SOLUTION INTRAVENOUS at 09:38

## 2022-08-28 RX ADMIN — Medication 400 MG: at 09:45

## 2022-08-28 ASSESSMENT — PAIN SCALES - GENERAL
PAINLEVEL_OUTOF10: 0
PAINLEVEL_OUTOF10: 0

## 2022-08-28 NOTE — PROGRESS NOTES
Per lab in [place] [facility] blood cultures positive for gram negative rods they will send out for further process.

## 2022-08-28 NOTE — PROGRESS NOTES
Hospitalist Progress Note      PCP: No primary care provider on file. Date of Admission: 8/25/2022    Chief Complaint: weakness     Subjective: pt awake/alert     Medications:  Reviewed    Infusion Medications    sodium chloride 100 mL/hr at 08/28/22 2290     Scheduled Medications    [Held by provider] potassium chloride  40 mEq Oral BID WC    magnesium oxide  400 mg Oral Daily    nicotine  1 patch TransDERmal Daily    piperacillin-tazobactam  3,375 mg IntraVENous Q8H    thiamine  100 mg Oral Daily    [Held by provider] enoxaparin  30 mg SubCUTAneous Daily     PRN Meds: ondansetron **OR** ondansetron, polyethylene glycol, acetaminophen **OR** acetaminophen, LORazepam **OR** LORazepam **OR** LORazepam **OR** LORazepam **OR** LORazepam **OR** LORazepam **OR** LORazepam **OR** LORazepam, sodium chloride flush      Intake/Output Summary (Last 24 hours) at 8/28/2022 1034  Last data filed at 8/27/2022 1807  Gross per 24 hour   Intake 1760 ml   Output --   Net 1760 ml       Exam:    /81   Pulse 72   Temp 98.1 °F (36.7 °C) (Oral)   Resp 18   Ht 5' 6\" (1.676 m)   Wt 104 lb 3.2 oz (47.3 kg)   LMP 06/28/2022 (Approximate)   SpO2 100%   BMI 16.82 kg/m²     General appearance: No apparent distress, appears stated age and cooperative. HEENT: Pupils equal, round, and reactive to light. Conjunctivae/corneas clear. Neck: Supple, with full range of motion. No jugular venous distention. Trachea midline. Respiratory:  Normal respiratory effort. Clear to auscultation, bilaterally without Rales/Wheezes/Rhonchi. Cardiovascular: Regular rate and rhythm with normal S1/S2 without murmurs, rubs or gallops. Abdomen: Soft, non-tender, non-distended with normal bowel sounds. Musculoskeletal: No clubbing, cyanosis or edema bilaterally. Full range of motion without deformity. Skin: Skin color, texture, turgor normal.  No rashes or lesions. Neurologic:  Neurovascularly intact without any focal sensory/motor deficits. Cranial nerves: II-XII intact, grossly non-focal.  Psychiatric: Alert and oriented, thought content appropriate, normal insight  Capillary Refill: Brisk,< 3 seconds   Peripheral Pulses: +2 palpable, equal bilaterally       Labs:   Recent Labs     08/26/22  0615 08/27/22  0601 08/28/22  0715   WBC 10.5* 9.4 6.7   HGB 11.2 11.5 10.6*   HCT 32.0* 32.7* 30.4*   PLT 73* 88* 124*     Recent Labs     08/26/22  0615 08/27/22  0600 08/28/22  0715    139 139   K 2.4* 3.0* 3.4    106 105   CO2 18* 21 21   BUN 8 7 5*   CREATININE 0.63 0.54 0.47*   CALCIUM 8.4* 8.7 8.8     No results for input(s): AST, ALT, BILIDIR, BILITOT, ALKPHOS in the last 72 hours. No results for input(s): INR in the last 72 hours. No results for input(s): Adonica Hoose in the last 72 hours. Urinalysis:      Lab Results   Component Value Date/Time    NITRU Positive 08/25/2022 10:19 AM    WBCUA 10-20 08/25/2022 10:19 AM    BACTERIA MODERATE 08/25/2022 10:19 AM    RBCUA 3-5 08/25/2022 10:19 AM    BLOODU Small 08/25/2022 10:19 AM    SPECGRAV <=1.005 08/25/2022 10:19 AM    GLUCOSEU Negative 08/25/2022 10:19 AM       Radiology:  XR CHEST (2 VW)   Final Result   NO ACUTE CARDIOPULMONARY DISEASE. Assessment/Plan:    Active Hospital Problems    Diagnosis Date Noted    UTI (urinary tract infection) [N39.0] 08/25/2022     Priority: Medium         DVT Prophylaxis: hold  Diet: ADULT DIET;  Regular  ADULT ORAL NUTRITION SUPPLEMENT; Breakfast, Dinner; Clear Liquid Oral Supplement  ADULT ORAL NUTRITION SUPPLEMENT; Lunch; Standard High Calorie/High Protein Oral Supplement  Code Status: Full Code    PT/OT Eval Status: done    Dispo -  High grade fever, leucocytosis, metabolic acidosis due to positive BC on admission- resolved after initiated zosyn/IV hydration, repeated BC 1/2 bottles has positive grow,  2 d echo done, will repeat BC today   UTI/pyuria- atbs as above  Fever/tremor, anxiety due to DT- ativan per protocol, advice cut down alcohol use.  Pt remained stable   Hypokalemia- replacing, follow up with BMP AM   Thrombocytopenia due to alcohol use- improving, hold lovenox, follow up with CBC AM   Medically stable for acute admission at [facility] signed by Viviana Meehan MD on 8/28/2022 at 10:34 AM

## 2022-08-28 NOTE — PROGRESS NOTES
Pt stated that she felt very anxious. Asked Pt to rate Anxiety from 1 /10. She stated a 7 and stated that she felt hot and sweaty. Completed CIWA score and administered 2 mg ativan. Pt has call light with reach and caregiver is at bedside.      Soledad Young RN

## 2022-08-29 LAB
ANION GAP SERPL CALCULATED.3IONS-SCNC: 13 MEQ/L (ref 9–15)
BANDED NEUTROPHILS RELATIVE PERCENT: 7 % (ref 5–11)
BASOPHILS ABSOLUTE: 0 K/UL (ref 0–0.1)
BASOPHILS RELATIVE PERCENT: 1 % (ref 0.1–1.2)
BUN BLDV-MCNC: 7 MG/DL (ref 6–20)
CALCIUM SERPL-MCNC: 9.2 MG/DL (ref 8.5–9.9)
CHLORIDE BLD-SCNC: 103 MEQ/L (ref 95–107)
CO2: 23 MEQ/L (ref 20–31)
CREAT SERPL-MCNC: 0.46 MG/DL (ref 0.5–0.9)
CULTURE, BLOOD ID SENSITIVITY: ABNORMAL
CULTURE, BLOOD ID SENSITIVITY: ABNORMAL
EOSINOPHILS ABSOLUTE: 0.1 K/UL (ref 0–0.4)
EOSINOPHILS RELATIVE PERCENT: 1 % (ref 0.7–5.8)
GFR AFRICAN AMERICAN: >60
GFR NON-AFRICAN AMERICAN: >60
GLUCOSE BLD-MCNC: 95 MG/DL (ref 70–99)
HCT VFR BLD CALC: 30.9 % (ref 37–47)
HEMOGLOBIN: 10.7 G/DL (ref 11.2–15.7)
HYPOCHROMIA: ABNORMAL
IMMATURE GRANULOCYTES #: 0.2 K/UL
IMMATURE GRANULOCYTES %: 2.2 %
LYMPHOCYTES ABSOLUTE: 2.1 K/UL (ref 1.2–3.7)
LYMPHOCYTES RELATIVE PERCENT: 28 %
MCH RBC QN AUTO: 34.2 PG (ref 25.6–32.2)
MCHC RBC AUTO-ENTMCNC: 34.6 % (ref 32.2–35.5)
MCV RBC AUTO: 98.7 FL (ref 79.4–94.8)
METAMYELOCYTES RELATIVE PERCENT: 2 %
MONOCYTES ABSOLUTE: 0.7 K/UL (ref 0.2–0.9)
MONOCYTES RELATIVE PERCENT: 9 % (ref 4.7–12.5)
MYELOCYTE PERCENT: 3 %
NEUTROPHILS ABSOLUTE: 4.7 K/UL (ref 1.6–6.1)
NEUTROPHILS RELATIVE PERCENT: 50 % (ref 34–71.1)
ORGANISM: ABNORMAL
PDW BLD-RTO: 13.1 % (ref 11.7–14.4)
PLATELET # BLD: 232 K/UL (ref 182–369)
PLATELET SLIDE REVIEW: ADEQUATE
POTASSIUM SERPL-SCNC: 3.8 MEQ/L (ref 3.4–4.9)
RBC # BLD: 3.13 M/UL (ref 3.93–5.22)
SLIDE REVIEW: ABNORMAL
SODIUM BLD-SCNC: 139 MEQ/L (ref 135–144)
TOXIC GRANULATION: ABNORMAL
WBC # BLD: 7.6 K/UL (ref 4–10)

## 2022-08-29 PROCEDURE — 36415 COLL VENOUS BLD VENIPUNCTURE: CPT

## 2022-08-29 PROCEDURE — 6370000000 HC RX 637 (ALT 250 FOR IP): Performed by: EMERGENCY MEDICINE

## 2022-08-29 PROCEDURE — 1210000000 HC MED SURG R&B

## 2022-08-29 PROCEDURE — 6370000000 HC RX 637 (ALT 250 FOR IP): Performed by: INTERNAL MEDICINE

## 2022-08-29 PROCEDURE — 85025 COMPLETE CBC W/AUTO DIFF WBC: CPT

## 2022-08-29 PROCEDURE — 2580000003 HC RX 258: Performed by: INTERNAL MEDICINE

## 2022-08-29 PROCEDURE — 6360000002 HC RX W HCPCS: Performed by: INTERNAL MEDICINE

## 2022-08-29 PROCEDURE — 80048 BASIC METABOLIC PNL TOTAL CA: CPT

## 2022-08-29 RX ORDER — POTASSIUM CHLORIDE 20 MEQ/1
40 TABLET, EXTENDED RELEASE ORAL ONCE
Status: COMPLETED | OUTPATIENT
Start: 2022-08-29 | End: 2022-08-29

## 2022-08-29 RX ADMIN — PIPERACILLIN AND TAZOBACTAM 3375 MG: 3; .375 INJECTION, POWDER, LYOPHILIZED, FOR SOLUTION INTRAVENOUS at 18:08

## 2022-08-29 RX ADMIN — PIPERACILLIN AND TAZOBACTAM 3375 MG: 3; .375 INJECTION, POWDER, LYOPHILIZED, FOR SOLUTION INTRAVENOUS at 10:10

## 2022-08-29 RX ADMIN — POTASSIUM CHLORIDE 40 MEQ: 1500 TABLET, EXTENDED RELEASE ORAL at 08:29

## 2022-08-29 RX ADMIN — LORAZEPAM 1 MG: 1 TABLET ORAL at 18:28

## 2022-08-29 RX ADMIN — Medication 400 MG: at 08:22

## 2022-08-29 RX ADMIN — Medication 100 MG: at 08:22

## 2022-08-29 RX ADMIN — PIPERACILLIN AND TAZOBACTAM 3375 MG: 3; .375 INJECTION, POWDER, LYOPHILIZED, FOR SOLUTION INTRAVENOUS at 02:02

## 2022-08-29 NOTE — PROGRESS NOTES
Spiritual Care Services     Summary of Visit:  Pt tearful during this visit, previous visit as well, but alone at this time. Struggles to care for herself and others and to work, feels like too much to manage. Conversation about how to balance these things, making small changes, making sure she is part of those for whom she cares. Asked what she needs to be able to care for herself. Not able to answer this at this time. Keenly aware of how much better she feels right now with rest, medicine, fluids, detox, nutrition. Spiritual Assessment/Intervention/Outcomes:    Encounter Summary  Encounter Overview/Reason : Spiritual/Emotional Needs  Service Provided For[de-identified] Patient  Referral/Consult From[de-identified] Rounding  Support System: Significant other  Last Encounter : 08/26/22  Complexity of Encounter: Moderate  Begin Time: 1045  End Time : 1115  Total Time Calculated: 30 min  Encounter   Type: Follow up     Spiritual/Emotional needs  Type: Emotional Distress, Spiritual Distress                    Values / Beliefs  Do You Have Any Ethnic, Cultural, Sacramental, or Spiritual Zoroastrianism Needs You Would Like Us To Be Aware of While You Are in the Hospital : No    Care Plan:    Continue emotional support. Spiritual Care Services   Electronically signed by Jennifer Marcial on 8/29/22 at 11:07 AM EDT     To reach a  for emotional and spiritual support, place an [facility] consult request.   If a  is needed immediately, dial 0 and ask to page the on-call .

## 2022-08-29 NOTE — PROGRESS NOTES
Hospitalist Progress Note      PCP: No primary care provider on file. Date of Admission: 8/25/2022    Chief Complaint: weakness/anxiety     Subjective: pt awake/alert     Medications:  Reviewed    Infusion Medications   Scheduled Medications    potassium chloride  40 mEq Oral Once    magnesium oxide  400 mg Oral Daily    nicotine  1 patch TransDERmal Daily    piperacillin-tazobactam  3,375 mg IntraVENous Q8H    thiamine  100 mg Oral Daily    [Held by provider] enoxaparin  30 mg SubCUTAneous Daily     PRN Meds: ondansetron **OR** ondansetron, polyethylene glycol, acetaminophen **OR** acetaminophen, LORazepam **OR** LORazepam **OR** LORazepam **OR** LORazepam **OR** LORazepam **OR** LORazepam **OR** LORazepam **OR** LORazepam, sodium chloride flush      Intake/Output Summary (Last 24 hours) at 8/29/2022 0742  Last data filed at 8/29/2022 0610  Gross per 24 hour   Intake 2250 ml   Output --   Net 2250 ml       Exam:    /81   Pulse 61   Temp 97.9 °F (36.6 °C) (Oral)   Resp 18   Ht 5' 6\" (1.676 m)   Wt 104 lb 3.2 oz (47.3 kg)   LMP 06/28/2022 (Approximate)   SpO2 98%   BMI 16.82 kg/m²     General appearance: No apparent distress, appears stated age and cooperative. HEENT: Pupils equal, round, and reactive to light. Conjunctivae/corneas clear. Neck: Supple, with full range of motion. No jugular venous distention. Trachea midline. Respiratory:  Normal respiratory effort. Clear to auscultation, bilaterally without Rales/Wheezes/Rhonchi. Cardiovascular: Regular rate and rhythm with normal S1/S2 without murmurs, rubs or gallops. Abdomen: Soft, non-tender, non-distended with normal bowel sounds. Musculoskeletal: No clubbing, cyanosis or edema bilaterally. Full range of motion without deformity. Skin: Skin color, texture, turgor normal.  No rashes or lesions. Neurologic:  Neurovascularly intact without any focal sensory/motor deficits.  Cranial nerves: II-XII intact, grossly non-focal.  Psychiatric: Alert and oriented, thought content appropriate, normal insight  Capillary Refill: Brisk,< 3 seconds   Peripheral Pulses: +2 palpable, equal bilaterally       Labs:   Recent Labs     08/27/22  0601 08/28/22  0715 08/29/22  0557   WBC 9.4 6.7 7.6   HGB 11.5 10.6* 10.7*   HCT 32.7* 30.4* 30.9*   PLT 88* 124* 232     Recent Labs     08/27/22  0600 08/28/22  0715 08/29/22  0557    139 139   K 3.0* 3.4 3.8    105 103   CO2 21 21 23   BUN 7 5* 7   CREATININE 0.54 0.47* 0.46*   CALCIUM 8.7 8.8 9.2     No results for input(s): AST, ALT, BILIDIR, BILITOT, ALKPHOS in the last 72 hours. No results for input(s): INR in the last 72 hours. No results for input(s): Ronda Horsfall in the last 72 hours. Urinalysis:      Lab Results   Component Value Date/Time    NITRU Positive 08/25/2022 10:19 AM    WBCUA 10-20 08/25/2022 10:19 AM    BACTERIA MODERATE 08/25/2022 10:19 AM    RBCUA 3-5 08/25/2022 10:19 AM    BLOODU Small 08/25/2022 10:19 AM    SPECGRAV <=1.005 08/25/2022 10:19 AM    GLUCOSEU Negative 08/25/2022 10:19 AM       Radiology:  XR CHEST (2 VW)   Final Result   NO ACUTE CARDIOPULMONARY DISEASE. Assessment/Plan:    Active Hospital Problems    Diagnosis Date Noted    UTI (urinary tract infection) [N39.0] 08/25/2022     Priority: Medium         DVT Prophylaxis: hold  Diet: ADULT DIET; Regular  ADULT ORAL NUTRITION SUPPLEMENT; Breakfast, Dinner; Clear Liquid Oral Supplement  ADULT ORAL NUTRITION SUPPLEMENT; Lunch; Standard High Calorie/High Protein Oral Supplement  Code Status: Full Code    PT/OT Eval Status: done    Dispo - High grade fever, leucocytosis, metabolic acidosis due to positive BC on admission- resolved after initiated zosyn/IV hydration, repeated BC 2/2 bottles has positive grow,  2 d echo done, BC was repeated yesterday- follow up with report   Fever/tremor, anxiety due to DT- ativan per protocol, advice cut down alcohol use. Pt remained stable.  Continue with CIWA protocol Hypokalemia- replaced    Thrombocytopenia due to alcohol use- improving, hold lovenox,   Medically stable for acute admission at [facility] signed by Oksana Diego MD on 8/29/2022 at 7:42 AM

## 2022-08-29 NOTE — PROGRESS NOTES
Pt was given 1 Mg of Ativan. Pt was crying when I entered her room. She stated that she was upset and being in the hospital and concerned that she may not be discharged tomorrow.  Pt has call light within reach     Cleveland Zamorano RN

## 2022-08-30 VITALS
SYSTOLIC BLOOD PRESSURE: 115 MMHG | BODY MASS INDEX: 16.74 KG/M2 | DIASTOLIC BLOOD PRESSURE: 77 MMHG | TEMPERATURE: 97.7 F | OXYGEN SATURATION: 98 % | WEIGHT: 104.2 LBS | HEART RATE: 69 BPM | HEIGHT: 66 IN | RESPIRATION RATE: 18 BRPM

## 2022-08-30 LAB
CULTURE, BLOOD ID SENSITIVITY: ABNORMAL
CULTURE, BLOOD ID SENSITIVITY: ABNORMAL
ORGANISM: ABNORMAL

## 2022-08-30 PROCEDURE — 6360000002 HC RX W HCPCS: Performed by: INTERNAL MEDICINE

## 2022-08-30 PROCEDURE — 2580000003 HC RX 258: Performed by: INTERNAL MEDICINE

## 2022-08-30 PROCEDURE — 6370000000 HC RX 637 (ALT 250 FOR IP): Performed by: INTERNAL MEDICINE

## 2022-08-30 RX ORDER — CIPROFLOXACIN 500 MG/1
500 TABLET, FILM COATED ORAL 2 TIMES DAILY
Qty: 28 TABLET | Refills: 0 | Status: SHIPPED | OUTPATIENT
Start: 2022-08-30 | End: 2022-09-13

## 2022-08-30 RX ORDER — LANOLIN ALCOHOL/MO/W.PET/CERES
400 CREAM (GRAM) TOPICAL DAILY
Qty: 30 TABLET | Refills: 0 | Status: SHIPPED | OUTPATIENT
Start: 2022-08-30

## 2022-08-30 RX ORDER — THIAMINE MONONITRATE (VIT B1) 100 MG
100 TABLET ORAL DAILY
Qty: 30 TABLET | Refills: 0 | Status: SHIPPED | OUTPATIENT
Start: 2022-08-30

## 2022-08-30 RX ADMIN — Medication 400 MG: at 08:42

## 2022-08-30 RX ADMIN — Medication 100 MG: at 08:42

## 2022-08-30 RX ADMIN — PIPERACILLIN AND TAZOBACTAM 3375 MG: 3; .375 INJECTION, POWDER, LYOPHILIZED, FOR SOLUTION INTRAVENOUS at 02:03

## 2022-08-30 NOTE — DISCHARGE SUMMARY
Discharge Summary    Patient:  Jorge Mcneill  YOB: 1980    MRN: [id number]   Acct: [de-identified]    Primary Care Physician: No primary care provider on file. Admit date:  8/25/2022    Discharge date:   08/30/22      Discharge Diagnoses:   UTI (urinary tract infection)  Principal Problem:    UTI (urinary tract infection)  Resolved Problems:    * No resolved hospital problems. *      Admitted for: [facility] Course: patient was admitted and treated for UTI/bacteremia/sepsis, UC and BC grow E coli. Had 2 sets of BC positive, the last one x3 remained negative on DC day. After completing her acute treatment she will be DC home with PO cipro x 14 days.  Advised to stop using alcohol     Consultants:      Discharge Medications:       Medication List        START taking these medications      ciprofloxacin 500 MG tablet  Commonly known as: CIPRO  Take 1 tablet by mouth 2 times daily for 14 days     magnesium oxide 400 (240 Mg) MG tablet  Commonly known as: MAG-OX  Take 1 tablet by mouth daily     vitamin B-1 100 MG tablet  Commonly known as: THIAMINE  Take 1 tablet by mouth daily               Where to Get Your Medications        These medications were sent to [facility], [place], [id number] [place] [address] [address] [facility]  [address], [place] [address]      Phone: [phone]   ciprofloxacin 500 MG tablet  magnesium oxide 400 (240 Mg) MG tablet  vitamin B-1 100 MG tablet         Physical Exam:    Vitals:  Vitals:    08/29/22 1150 08/29/22 1813 08/29/22 2056 08/30/22 0612   BP: (!) 128/91 (!) 128/92 128/84 115/77   Pulse: 71 87 73 69   Resp:   18 18   Temp:   98.4 °F (36.9 °C) 97.7 °F (36.5 °C)   TempSrc:   Oral Oral   SpO2:   100% 98%   Weight:       Height:         Weight: Weight: 104 lb 3.2 oz (47.3 kg)     24 hour intake/output:  Intake/Output Summary (Last 24 hours) at 8/30/2022 0838  Last data filed at 8/30/2022 0612  Gross per 24 hour   Intake 555 ml Output --   Net 555 ml       General appearance - alert, well appearing, and in no distress  Chest - clear to auscultation, no wheezes, rales or rhonchi, symmetric air entry  Heart - normal rate, regular rhythm, normal S1, S2, no murmurs, rubs, clicks or gallops  Abdomen - soft, nontender, nondistended, no masses or organomegaly  Obese: No; Protuberant: No   Neurological - alert, oriented, normal speech, no focal findings or movement disorder noted  Extremities - peripheral pulses normal, no pedal edema, no clubbing or cyanosis  Skin - normal coloration and turgor, no rashes, no suspicious skin lesions noted    Procedures:      Diagnostic Test:      Radiology reports as per the Radiologist  Radiology: XR CHEST (2 VW)    Result Date: 8/25/2022  EXAMINATION: XR CHEST (2 VW) CLINICAL HISTORY: FEVER COMPARISONS: None available. FINDINGS: Osseous structures are intact. Cardiopericardial silhouette is normal. Pulmonary vasculature is normal. Lungs are clear. NO ACUTE CARDIOPULMONARY DISEASE. Echo 2d w doppler w color w contrast    Result Date: 8/27/2022  Transthoracic Echocardiography Report (TTE)  Demographics   Patient Name    Rise Brent  Gender               Female                  R   Patient Number  [id number]         Race                                                   Ethnicity   Visit Number    [id number]      Room Number          0222   Corporate ID                   Date of Study        08/26/2022   Accession       [id number]     Referring Physician  Number   Date of Birth   1980     Sonographer          Sandee Moore [facility]   Age             38 year(s)     Interpreting         [facility])                                 Physician            Cardiology                                                      [facility] DR. Sepulveda  Procedure Type of Study   TTE procedure:ECHOCARDIOGRAM 2D DOPPLER W COLOR W CONTRAST.   Procedure Date Date: 08/26/2022 Start: 06:33 PM Study Location: [facility] Peak E-Wave: 0.99 m/s                 Peak A-Wave: 0.67 m/s                                        E/A Ratio: 1.48                                        Peak Gradient: 3.9 mmHg                                        Deceleration Time: 135.7 msec   Tissue Doppler   E' Septal Velocity: 0.13 m/s  E' Lateral Velocity: 0.21 m/s   Aortic Valve   Peak Velocity: 1.24 m/s                Mean Velocity: 0.86 m/s  Peak Gradient: 6.12 mmHg               Mean Gradient: 3.37 mmHg  Area (continuity): 2.29 cm^2           Area (2D): 2.29 cm^2  AV VTI: 22.28 cm   Cusp Separation: 1.75 cm   Pulmonic Valve   Peak Velocity: 1.02 m/s          Peak Gradient: 4.14 mmHg                                    Acceleration Time: 127.5 msec   LVOT   Peak Velocity: 0.95 m/s              Mean Velocity: 0.71 m/s  Peak Gradient: 3.63 mmHg             Mean Gradient: 2.21 mmHg  LVOT Diameter: 1.98 cm               LVOT VTI: 16.56 cm  Structures  Left Atrium   LA Dimension: 3.02 cm                         LA Area: 7.72 cm^2  LA/Aorta: 1.03  LA Volume/Index: 29.81 ml /20 m^2   Left Ventricle   Diastolic Dimension: 3.55 cm         Systolic Dimension: 6.77 cm  Septum Diastolic: 3.99 cm            Septum Systolic: 5.00 cm  PW Diastolic: 9.11 cm                PW Systolic: 8.20 cm                                       FS: 29.4 %  LV EDV/LV EDV Index: 69.02 ml/46 m^2 LV ESV/LV ESV Index: 29.76 ml/20 m^2  EF Calculated: 56.9 %                LV Length: 7.17 cm   LVOT Diameter: 1.98 cm  Aorta/ Miscellaneous Aorta   Aortic Root: 2.92 cm  LVOT Diameter: 1.98 cm  Pulmonary Vein:   S Velocity: 0.5 m/s         A Reversal Velocity: 0.35 m/s  D Velocity: 0.51 m/s        A Reversal Duration: 173.2 msec         Results for orders placed or performed during the hospital encounter of 08/25/22   Culture, Blood 1    Specimen: Blood   Result Value Ref Range    Blood Culture, Routine (A)      Gram stain aerobic bottle  Gram stain anaerobic bottle  Gram negative rods  2 out of Sensitivity For susceptibility, refer to previous culture. Culture, Blood 1    Specimen: Blood   Result Value Ref Range    Blood Culture, Routine (A)      Gram stain aerobic bottle  Gram negative rods  2 out of 2 blood cultures  Further testing performed at [facility] 15, Blood 2    Specimen: Blood   Result Value Ref Range    Culture, Blood 2 (A)      Gram stain anaerobic bottle  Gram negative rods  1 out of 2 blood cultures  Further testing performed at [place] [address]     Culture, Blood ID Sensitivity    Specimen: Blood   Result Value Ref Range    Culture, Blood Id Sensitivity (A)      Cult,Blood:  POSITIVE Blood Culture  Performed at [address] [place] [address]  [phone]      Organism Escherichia coli (A)        Susceptibility    Escherichia coli - BACTERIAL SUSCEPTIBILITY PANEL BY MICKY     ampicillin >=32 Resistant mcg/mL     aztreonam <=1 Sensitive mcg/mL     ceFAZolin <=4 Sensitive mcg/mL     cefTRIAXone <=1 Sensitive mcg/mL     ciprofloxacin <=0.25 Sensitive mcg/mL     Confirmatory Extended Spectrum Beta-Lactamase NEGATIVE Sensitive mcg/mL     gentamicin <=1 Sensitive mcg/mL     piperacillin-tazobactam <=4 Sensitive mcg/mL     trimethoprim-sulfamethoxazole >=320 Resistant mcg/mL   Culture, Blood ID Sensitivity    Specimen: Blood   Result Value Ref Range    Culture, Blood Id Sensitivity (A)      Cult,Blood:  POSITIVE Blood Culture  Performed at [address] [place] [phone]      Organism Escherichia coli (A)     Culture, Blood Id Sensitivity For susceptibility, refer to previous culture. Culture, Blood 1    Specimen: Blood   Result Value Ref Range    Blood Culture, Routine       No Growth to date. Any change in status will be called. Culture, Blood 2    Specimen: Blood   Result Value Ref Range    Culture, Blood 2       No Growth to date. Any change in status will be called.    Culture, Blood ID Sensitivity Specimen: Blood   Result Value Ref Range    Culture, Blood Id Sensitivity (A)      Cult,Blood:  POSITIVE Blood Culture  Performed at [facility] [address]  [phone]      Organism Escherichia coli (A)     Culture, Blood Id Sensitivity For susceptibility, refer to previous culture.     CBC with Auto Differential   Result Value Ref Range    WBC 14.5 (H) 4.0 - 10.0 K/uL    RBC 3.77 (L) 3.93 - 5.22 M/uL    Hemoglobin 13.1 11.2 - 15.7 g/dL    Hematocrit 36.2 (L) 37.0 - 47.0 %    MCV 96.0 (H) 79.4 - 94.8 fL    MCH 34.7 (H) 25.6 - 32.2 pg    MCHC 36.2 (H) 32.2 - 35.5 %    RDW 12.1 11.7 - 14.4 %    Platelets 83 (L) 089 - 369 K/uL    PLATELET SLIDE REVIEW Decreased     SLIDE REVIEW see below     Neutrophils % 88.7 (H) 34.0 - 71.1 %    Immature Granulocytes % 0.6 %    Lymphocytes % 3.4 %    Monocytes % 6.2 4.7 - 12.5 %    Eosinophils % 0.8 0.7 - 5.8 %    Basophils % 0.3 0.1 - 1.2 %    Neutrophils Absolute 12.9 (H) 1.6 - 6.1 K/uL    Immature Granulocytes # 0.1 K/uL    Lymphocytes Absolute 0.5 (L) 1.2 - 3.7 K/uL    Monocytes Absolute 0.9 0.2 - 0.9 K/uL    Eosinophils Absolute 0.1 0.0 - 0.4 K/uL    Basophils Absolute 0.1 0.0 - 0.1 K/uL   Comprehensive Metabolic Panel   Result Value Ref Range    Sodium 133 (L) 135 - 144 mEq/L    Potassium 3.3 (L) 3.4 - 4.9 mEq/L    Chloride 95 95 - 107 mEq/L    CO2 19 (L) 20 - 31 mEq/L    Anion Gap 19 (H) 9 - 15 mEq/L    Glucose 128 (H) 70 - 99 mg/dL    BUN 11 6 - 20 mg/dL    Creatinine 0.81 0.50 - 0.90 mg/dL    GFR Non-African American >60.0 >60    GFR  >60.0 >60    Calcium 9.7 8.5 - 9.9 mg/dL    Total Protein 7.4 6.3 - 8.0 g/dL    Albumin 4.0 3.5 - 4.6 g/dL    Total Bilirubin 1.2 (H) 0.2 - 0.7 mg/dL    Alkaline Phosphatase 177 (H) 40 - 130 U/L    ALT 76 (H) 0 - 33 U/L     (H) 0 - 35 U/L    Globulin 3.4 2.3 - 3.5 g/dL   Lactic Acid   Result Value Ref Range    Lactic Acid 2.6 (H) 0.5 - 2.2 mmol/L   Pregnancy, Urine   Result Value Ref Range HCG(Urine) Pregnancy Test Negative Detects HCG level >20 MIU/mL   Ethanol   Result Value Ref Range    Ethanol Lvl 17 mg/dL    Ethanol percent 0.015 G/dL   Urinalysis with Reflex to Culture    Specimen: Urine   Result Value Ref Range    Color, UA Yellow Straw/Yellow    Clarity, UA SLCLOUDY Clear    Glucose, Ur Negative Negative mg/dL    Bilirubin Urine Negative Negative    Ketones, Urine Negative Negative mg/dL    Specific Gravity, UA <=1.005 1.005 - 1.030    Blood, Urine Small Negative    pH, UA 6.5 5.0 - 9.0    Protein,  (A) Negative mg/dL    Urobilinogen, Urine 0.2 <2.0 E.U./dL    Nitrite, Urine Positive Negative    Leukocyte Esterase, Urine Moderate Negative    Urine Reflex to Culture Yes    Microscopic Urinalysis   Result Value Ref Range    WBC, UA 10-20 (A) 0 - 5 /HPF    RBC, UA 3-5 (A) 0 - 2 /HPF    Epithelial Cells, UA 10-20 /HPF    Bacteria, UA MODERATE (A) Negative /HPF   Urine Drug Screen, Comprehensive   Result Value Ref Range    PCP Screen, Urine Neg Negative <25 ng/mL    Benzodiazepine Screen, Urine Neg Negative <150 ng/mL    Cocaine Metabolite Screen, Urine Neg Negative <150 ng/mL    Amphetamine Screen, Urine Neg Negative <500 ng/mL    Cannabinoid Scrn, Ur POSITIVE (A) Negative <50 ng/mL    Opiate Scrn, Ur Neg Negative <100 ng/mL    Barbiturate Screen, Ur Neg Negative <200 ng/mL    Drug Screen Comment: see below    Basic Metabolic Panel w/ Reflex to MG   Result Value Ref Range    Sodium 138 135 - 144 mEq/L    Potassium reflex Magnesium 2.4 (LL) 3.4 - 4.9 mEq/L    Chloride 106 95 - 107 mEq/L    CO2 18 (L) 20 - 31 mEq/L    Anion Gap 14 9 - 15 mEq/L    Glucose 67 (L) 70 - 99 mg/dL    BUN 8 6 - 20 mg/dL    Creatinine 0.63 0.50 - 0.90 mg/dL    GFR Non-African American >60.0 >60    GFR  >60.0 >60    Calcium 8.4 (L) 8.5 - 9.9 mg/dL   CBC with Auto Differential   Result Value Ref Range    WBC 10.5 (H) 4.0 - 10.0 K/uL    RBC 3.26 (L) 3.93 - 5.22 M/uL    Hemoglobin 11.2 11.2 - 15.7 g/dL Hematocrit 32.0 (L) 37.0 - 47.0 %    MCV 98.2 (H) 79.4 - 94.8 fL    MCH 34.4 (H) 25.6 - 32.2 pg    MCHC 35.0 32.2 - 35.5 %    RDW 12.8 11.7 - 14.4 %    Platelets 73 (L) 066 - 369 K/uL    PLATELET SLIDE REVIEW Decreased     Neutrophils % 83.0 (H) 34.0 - 71.1 %    Immature Granulocytes % 0.9 %    Lymphocytes % 6.3 %    Monocytes % 8.8 4.7 - 12.5 %    Eosinophils % 0.6 (L) 0.7 - 5.8 %    Basophils % 0.4 0.1 - 1.2 %    Neutrophils Absolute 8.7 (H) 1.6 - 6.1 K/uL    Immature Granulocytes # 0.1 K/uL    Lymphocytes Absolute 0.7 (L) 1.2 - 3.7 K/uL    Monocytes Absolute 0.9 0.2 - 0.9 K/uL    Eosinophils Absolute 0.1 0.0 - 0.4 K/uL    Basophils Absolute 0.0 0.0 - 0.1 K/uL   Magnesium   Result Value Ref Range    Magnesium 1.7 1.7 - 2.4 mg/dL   Blood ID, Molecular   Result Value Ref Range    Acinetobacter calcoac baumannii complex by PCR Not Detected Not Detected    Bacteroides fragilis by PCR Not Detected Not Detected    Enterobacteriaceae by PCR DETECTED (A) Not Detected    Enterobacter cloacae complex by PCR Not Detected Not Detected    Enterococcus faecalis by PCR Not Detected Not Detected    Enterococcus faecium by PCR Not Detected Not Detected    Escherichia coli by PCR DETECTED (A) Not Detected    Haemophilus Influenzae by PCR Not Detected Not Detected    Klebsiella aerogenes by PCR Not Detected Not Detected    Klebsiella oxytoca by PCR Not Detected Not Detected    Klebsiella pneumoniae group by PCR Not Detected Not Detected    Listeria monocytogenes by PCR Not Detected Not Detected    Neisseria meningitidis by PCR Not Detected Not Detected    Proteus species by PCR Not Detected Not Detected    Pseudomonas aeruginosa by PCR Not Detected Not Detected    Salmonella species by PCR Not Detected Not Detected    Streptococcus agalactiae by PCR Not Detected Not Detected    Staphylococcus aureus by PCR Not Detected Not Detected    Staphylococcus epidermidis by PCR Not Detected Not Detected    Staphylococcus lugdunensis by PCR Not Detected Not Detected    Staphylococcus species by PCR Not Detected Not Detected    Serratia marcescens by PCR Not Detected Not Detected    Streptococcus pneumoniae by PCR Not Detected Not Detected    Streptococcus pyogenes  by PCR Not Detected Not Detected    Streptococcus species by PCR Not Detected Not Detected    Stenotrophomonas maltophilia by PCR Not Detected Not Detected    Candida albicans by PCR Not Detected Not Detected    Candida auris by PCR Not Detected Not Detected    Candida glabrata by PCR Not Detected Not Detected    Candida krusei by PCR Not Detected Not Detected    Candida parapsilosis by PCR Not Detected Not Detected    Candida tropicalis by PCR Not Detected Not Detected    Cryptococcus neoformans/gattii by PCR Not Detected Not Detected    Cephalosporin Resistance CTX-M gene by PCR Not Detected Not Detected    Carbapenem Resistance IMP gene by PCR Not Detected Not Detected    Carbapenem Resistance KPC by PCR Not Detected Not Detected    Colistin Resistance mcr-1 gene by PCR Not Detected Not Detected    Carbapenem Resistance NDM gene by PCR Not Detected Not Detected    Carbapenem Resistance OXA-48 gene by PCR Not Detected Not Detected    Carbapenem Resistance VIM gene Detected Not Detected Not Detected   Lactic Acid   Result Value Ref Range    Lactic Acid 2.9 (H) 0.5 - 2.2 mmol/L   CBC with Auto Differential   Result Value Ref Range    WBC 9.4 4.0 - 10.0 K/uL    RBC 3.39 (L) 3.93 - 5.22 M/uL    Hemoglobin 11.5 11.2 - 15.7 g/dL    Hematocrit 32.7 (L) 37.0 - 47.0 %    MCV 96.5 (H) 79.4 - 94.8 fL    MCH 33.9 (H) 25.6 - 32.2 pg    MCHC 35.2 32.2 - 35.5 %    RDW 12.6 11.7 - 14.4 %    Platelets 88 (L) 790 - 369 K/uL    PLATELET SLIDE REVIEW Decreased     Neutrophils % 74.5 (H) 34.0 - 71.1 %    Immature Granulocytes % 1.0 %    Lymphocytes % 8.8 %    Monocytes % 13.9 (H) 4.7 - 12.5 %    Eosinophils % 1.4 0.7 - 5.8 %    Basophils % 0.4 0.1 - 1.2 %    Neutrophils Absolute 7.0 (H) 1.6 - 6.1 K/uL Immature Granulocytes # 0.1 K/uL    Lymphocytes Absolute 0.8 (L) 1.2 - 3.7 K/uL    Monocytes Absolute 1.3 (H) 0.2 - 0.9 K/uL    Eosinophils Absolute 0.1 0.0 - 0.4 K/uL    Basophils Absolute 0.0 0.0 - 0.1 K/uL   Basic Metabolic Panel   Result Value Ref Range    Sodium 139 135 - 144 mEq/L    Potassium 3.0 (LL) 3.4 - 4.9 mEq/L    Chloride 106 95 - 107 mEq/L    CO2 21 20 - 31 mEq/L    Anion Gap 12 9 - 15 mEq/L    Glucose 86 70 - 99 mg/dL    BUN 7 6 - 20 mg/dL    Creatinine 0.54 0.50 - 0.90 mg/dL    GFR Non-African American >60.0 >60    GFR  >60.0 >60    Calcium 8.7 8.5 - 9.9 mg/dL   Lactic Acid   Result Value Ref Range    Lactic Acid 1.2 0.5 - 2.2 mmol/L   CBC with Auto Differential   Result Value Ref Range    WBC 6.7 4.0 - 10.0 K/uL    RBC 3.10 (L) 3.93 - 5.22 M/uL    Hemoglobin 10.6 (L) 11.2 - 15.7 g/dL    Hematocrit 30.4 (L) 37.0 - 47.0 %    MCV 98.1 (H) 79.4 - 94.8 fL    MCH 34.2 (H) 25.6 - 32.2 pg    MCHC 34.9 32.2 - 35.5 %    RDW 13.0 11.7 - 14.4 %    Platelets 037 (L) 228 - 369 K/uL    PLATELET SLIDE REVIEW Decreased     Neutrophils % 57.6 34.0 - 71.1 %    Immature Granulocytes % 0.9 %    Lymphocytes % 21.6 %    Monocytes % 17.3 (H) 4.7 - 12.5 %    Eosinophils % 1.8 0.7 - 5.8 %    Basophils % 0.8 0.1 - 1.2 %    Neutrophils Absolute 3.8 1.6 - 6.1 K/uL    Immature Granulocytes # 0.1 K/uL    Lymphocytes Absolute 1.4 1.2 - 3.7 K/uL    Monocytes Absolute 1.2 (H) 0.2 - 0.9 K/uL    Eosinophils Absolute 0.1 0.0 - 0.4 K/uL    Basophils Absolute 0.1 0.0 - 0.1 K/uL   Basic Metabolic Panel   Result Value Ref Range    Sodium 139 135 - 144 mEq/L    Potassium 3.4 3.4 - 4.9 mEq/L    Chloride 105 95 - 107 mEq/L    CO2 21 20 - 31 mEq/L    Anion Gap 13 9 - 15 mEq/L    Glucose 80 70 - 99 mg/dL    BUN 5 (L) 6 - 20 mg/dL    Creatinine 0.47 (L) 0.50 - 0.90 mg/dL    GFR Non-African American >60.0 >60    GFR  >60.0 >60    Calcium 8.8 8.5 - 9.9 mg/dL   CBC with Auto Differential   Result Value Ref Range home    Condition: Stable    Time Spent: 45 minutes    Electronically signed by Clary Jacome MD on 8/30/2022 at 8:38 AM    Discharging Hospitalist

## 2022-08-30 NOTE — PROGRESS NOTES
AVS printed and explained to pt. Pt stated understanding and questions answered. Pt wanted to make own follow up appointment. Pt left unit via ambulation with staff and family. Pt gathered and took own belongings.    Electronically signed by Natasha Silverio RN on 8/30/2022 at 9:17 AM

## 2022-09-03 LAB
BLOOD CULTURE, ROUTINE: NORMAL
CULTURE, BLOOD 2: NORMAL

## 2022-09-24 PROBLEM — N39.0 UTI (URINARY TRACT INFECTION): Status: RESOLVED | Noted: 2022-08-25 | Resolved: 2022-09-24
